# Patient Record
Sex: FEMALE | Race: WHITE | NOT HISPANIC OR LATINO | Employment: FULL TIME | URBAN - NONMETROPOLITAN AREA
[De-identification: names, ages, dates, MRNs, and addresses within clinical notes are randomized per-mention and may not be internally consistent; named-entity substitution may affect disease eponyms.]

---

## 2018-01-03 ENCOUNTER — HOSPITAL ENCOUNTER (EMERGENCY)
Facility: HOSPITAL | Age: 28
Discharge: HOME | End: 2018-01-03
Attending: STUDENT IN AN ORGANIZED HEALTH CARE EDUCATION/TRAINING PROGRAM | Admitting: STUDENT IN AN ORGANIZED HEALTH CARE EDUCATION/TRAINING PROGRAM

## 2018-01-03 VITALS
OXYGEN SATURATION: 99 % | HEART RATE: 77 BPM | WEIGHT: 205 LBS | TEMPERATURE: 97.6 F | HEIGHT: 63 IN | SYSTOLIC BLOOD PRESSURE: 137 MMHG | RESPIRATION RATE: 18 BRPM | DIASTOLIC BLOOD PRESSURE: 93 MMHG | BODY MASS INDEX: 36.32 KG/M2

## 2018-01-03 DIAGNOSIS — M54.50 ACUTE BILATERAL LOW BACK PAIN WITHOUT SCIATICA: Primary | ICD-10-CM

## 2018-01-03 PROCEDURE — 99283 EMERGENCY DEPT VISIT LOW MDM: CPT

## 2018-01-03 RX ORDER — IBUPROFEN 600 MG/1
600 TABLET ORAL EVERY 6 HOURS PRN
COMMUNITY
End: 2023-02-23

## 2018-01-03 RX ORDER — CYCLOBENZAPRINE HCL 10 MG
10 TABLET ORAL 3 TIMES DAILY PRN
Qty: 20 TABLET | Refills: 0 | Status: SHIPPED | OUTPATIENT
Start: 2018-01-03 | End: 2023-02-23

## 2018-01-03 RX ORDER — LABETALOL 100 MG/1
100 TABLET, FILM COATED ORAL 2 TIMES DAILY
COMMUNITY
End: 2023-02-23

## 2018-01-03 RX ORDER — RANITIDINE 150 MG/1
25 TABLET ORAL 2 TIMES DAILY
COMMUNITY
End: 2023-02-23

## 2018-01-03 RX ORDER — OXYCODONE AND ACETAMINOPHEN 10; 325 MG/1; MG/1
1 TABLET ORAL ONCE
Status: COMPLETED | OUTPATIENT
Start: 2018-01-03 | End: 2018-01-03

## 2018-01-03 RX ADMIN — OXYCODONE HYDROCHLORIDE AND ACETAMINOPHEN 1 TABLET: 10; 325 TABLET ORAL at 16:46

## 2018-01-03 NOTE — ED PROVIDER NOTES
Subjective   HPI Comments: 27-year-old female presents with acute lower back pain.     The patient had a epidural about 60 days ago and said that immediately thereafter she had some residual numbness to the right and left leg but the right leg resolved by the time she was out of the hospital.  She's had a little bit of numb sensation left leg but tells me today that that has gotten back to normal.  However, she's had her  do all the diaper changes and she's really been taking it easy around the house.  Today she had leaned over the bathtub and then also was changing some diapers etc. and developed the acute lower back pain.  She denies any saddle anesthesia, bowel-bladder incontinence or urinary retention.  There is some sharp pain into the left leg but not constantly and not below the knee.  She does have pain across her back.  No actual motor weakness.  She's been breast-feeding but says that she can certainly change to formula if needed.  She denies chronic back pain or any prior back surgery.  The pain does worsen with sitting and movement.  It eased by lying still.  No fever, chills, rash.      Review of Systems   All other systems reviewed and are negative.      History reviewed. No pertinent past medical history.    No Known Allergies    Past Surgical History:   Procedure Laterality Date   • CHOLECYSTECTOMY     • DENTAL PROCEDURE     • TONSILLECTOMY         History reviewed. No pertinent family history.    Social History     Social History   • Marital status:      Spouse name: N/A   • Number of children: N/A   • Years of education: N/A     Social History Main Topics   • Smoking status: Former Smoker     Quit date: 3/3/2017   • Smokeless tobacco: Never Used   • Alcohol use No   • Drug use: No   • Sexual activity: Not Asked     Other Topics Concern   • None     Social History Narrative   • None           Objective   Physical Exam   Constitutional: She is oriented to person, place, and time. She  "appears well-developed and well-nourished. No distress.   HENT:   Head: Normocephalic and atraumatic.   Eyes: EOM are normal. No scleral icterus.   Neck: Normal range of motion.   Cardiovascular: Normal rate, regular rhythm and normal heart sounds.    No murmur heard.  Pulmonary/Chest: Effort normal and breath sounds normal. No respiratory distress.   Abdominal: Bowel sounds are normal. She exhibits no distension.   Musculoskeletal: Normal range of motion. She exhibits no edema or deformity.   Limited range of motion with flexion and extension.  She has pain across the entire lower lumbar region with associated spasm.  Motor is intact and symmetrical upper and lower.  Sensation is likewise intact upper and lower.  Normal pedal pulses no peripheral edema.   Neurological: She is alert and oriented to person, place, and time. No cranial nerve deficit. She exhibits normal muscle tone. Coordination normal.   Skin: Skin is warm. No rash noted. She is not diaphoretic.   Psychiatric: She has a normal mood and affect. Her behavior is normal. Thought content normal.   Vitals reviewed.      Procedures         ED Course  ED Course   Comment By Time   I'm going to treat the patient for an acute lower back strain.  She has no neurologic red flags to indicate a neurosurgical emergency regarding her back.  I did explain that since she's been breast-feeding she's either going to have to switch to formula or \"pump and dump\" and she understands this.  Tells me her OB doctor has a lactation specialist and that she can call their office for guidance but for now she's going to switch to formula.  I recommended ibuprofen for the next several days and I'm going to try muscle relaxant as well.  I'm going to give her a Percocet by mouth here.  Given her symptoms I don't feel plain x-rays are going to be beneficial.  I explained thing she can do to help her back and also she needs to see her primary care provider in the next few days.  I " think physical therapy will help her more than anything and her PCP can refer her if they feel this would be beneficial.  The patient understands and is in agreement with my plan here today.  She understands that the ibuprofen, muscle relaxant and Percocet will all be in her breast milk if she doesn't pump and dump.  Said she will speak to the pharmacist as well about duration of time to wait if she does take these medicines and decide to breast feed Jerardo Srinivasan PA-C 01/03 1650                  [facility]    Final diagnoses:   Acute bilateral low back pain without sciatica            Jerardo Srinivasan PA-C  01/03/18 2253

## 2023-02-23 ENCOUNTER — OFFICE VISIT (OUTPATIENT)
Dept: FAMILY MEDICINE CLINIC | Facility: CLINIC | Age: 33
End: 2023-02-23
Payer: COMMERCIAL

## 2023-02-23 VITALS
BODY MASS INDEX: 45.36 KG/M2 | DIASTOLIC BLOOD PRESSURE: 80 MMHG | OXYGEN SATURATION: 97 % | HEART RATE: 108 BPM | SYSTOLIC BLOOD PRESSURE: 110 MMHG | WEIGHT: 256 LBS | HEIGHT: 63 IN | TEMPERATURE: 97.5 F

## 2023-02-23 DIAGNOSIS — E66.01 MORBID OBESITY WITH BMI OF 45.0-49.9, ADULT: ICD-10-CM

## 2023-02-23 DIAGNOSIS — F34.1 PERSISTENT DEPRESSIVE DISORDER: ICD-10-CM

## 2023-02-23 DIAGNOSIS — F41.9 ANXIETY: Primary | ICD-10-CM

## 2023-02-23 DIAGNOSIS — K21.9 GASTROESOPHAGEAL REFLUX DISEASE, UNSPECIFIED WHETHER ESOPHAGITIS PRESENT: ICD-10-CM

## 2023-02-23 PROCEDURE — 99203 OFFICE O/P NEW LOW 30 MIN: CPT | Performed by: PHYSICIAN ASSISTANT

## 2023-02-23 RX ORDER — OMEPRAZOLE 40 MG/1
40 CAPSULE, DELAYED RELEASE ORAL DAILY
COMMUNITY

## 2023-02-23 RX ORDER — FLUOXETINE HYDROCHLORIDE 20 MG/1
20 CAPSULE ORAL DAILY
COMMUNITY

## 2023-02-23 RX ORDER — CHOLECALCIFEROL (VITAMIN D3) 25 MCG
CAPSULE ORAL
COMMUNITY

## 2023-02-23 RX ORDER — CETIRIZINE HYDROCHLORIDE 10 MG/1
10 TABLET ORAL DAILY
COMMUNITY

## 2023-02-23 RX ORDER — ATENOLOL 25 MG/1
25 TABLET ORAL DAILY
COMMUNITY

## 2023-02-23 NOTE — PROGRESS NOTES
Subjective        Chief Complaint  Establish Care    Subjective      History of Present Illness  Rosa Thakkar is a 32 y.o. female who presents today to [facility] for Establish Care. Past medical history is significant for anxiety, GERD, depression, and obesity.     Anxiety:   Depression:   Currently managed on fluoxetine 20 mg daily.  Reports that she has been struggling more recently, but overall feels better since she has been on the fluoxetine for some time.  She is not interested in additional medication for now.  She has a family history of anxiety, depression, and suicide attempts in both her mother and sister.  She has also been on BuSpar in the past, however, feels that she did not give it appropriate time to help her symptoms.  She does have difficulty with sleeping currently.    GERD:  Managed on omeprazole 40 mg daily.  No previous history of endoscopies.  She was set up for an EGD in the past, but did not follow through.  Her symptoms have been well controlled on the omeprazole.    Chronic allergic rhinitis:   As long as she takes her cetirizine 10 mg daily, she does fairly well.  Denies any specific allergens.    Morbid obesity per BMI, 45.35:  Reports previous weight loss when she was placed on metformin for prediabetes a few years ago.  Her levels improved as did her weight, however, she came off of the medicine and she gained all of her weight back +.  She is not currently following any dietary guidelines or exercising regularly.      Current Outpatient Medications:   •  atenolol (TENORMIN) 25 MG tablet, Take 25 mg by mouth Daily., Disp: , Rfl:   •  cetirizine (zyrTEC) 10 MG tablet, Take 10 mg by mouth Daily., Disp: , Rfl:   •  Cholecalciferol (Vitamin D-3) 25 MCG (1000 UT) capsule, Take  by mouth., Disp: , Rfl:   •  FLUoxetine (PROzac) 20 MG capsule, Take 20 mg by mouth Daily., Disp: , Rfl:   •  omeprazole (priLOSEC) 40 MG capsule, Take 40 mg by mouth Daily.,  "Disp: , Rfl:       No Known Allergies    Objective     Objective   Vital Signs:  Blood Pressure 110/80   Pulse 108   Temperature 97.5 °F (36.4 °C) (Temporal)   Height 160 cm (63\")   Weight 116 kg (256 lb)   Oxygen Saturation 97%   Body Mass Index 45.35 kg/m²   Estimated body mass index is 45.35 kg/m² as calculated from the following:    Height as of this encounter: 160 cm (63\").    Weight as of this encounter: 116 kg (256 lb).        Past Medical History:   Diagnosis Date   • Anxiety    • Depression    • Fatigue    • GERD (gastroesophageal reflux disease)      Past Surgical History:   Procedure Laterality Date   • CHOLECYSTECTOMY     • DENTAL PROCEDURE     • OVARIAN CYST REMOVAL     • TONSILLECTOMY       Social History     Socioeconomic History   • Marital status:    Tobacco Use   • Smoking status: Former     Packs/day: 1.00     Years: 15.00     Pack years: 15.00     Types: Cigarettes     Quit date: 3/3/2017     Years since quittin.9   • Smokeless tobacco: Never   Vaping Use   • Vaping Use: Never used   Substance and Sexual Activity   • Alcohol use: No   • Drug use: No      Physical Exam  Vitals and nursing note reviewed.   Constitutional:       General: She is not in acute distress.     Appearance: She is well-developed. She is not diaphoretic.   HENT:      Head: Normocephalic and atraumatic.   Eyes:      General: No scleral icterus.        Right eye: No discharge.         Left eye: No discharge.      Conjunctiva/sclera: Conjunctivae normal.   Neck:      Vascular: No carotid bruit.   Cardiovascular:      Rate and Rhythm: Normal rate and regular rhythm.      Heart sounds: Normal heart sounds. No murmur heard.    No friction rub. No gallop.   Pulmonary:      Effort: Pulmonary effort is normal. No respiratory distress.      Breath sounds: Normal breath sounds. No wheezing or rales.   Chest:      Chest wall: No tenderness.   Musculoskeletal:         General: Normal range of motion.      Cervical back: " Normal range of motion and neck supple. No tenderness.   Lymphadenopathy:      Cervical: No cervical adenopathy.   Skin:     General: Skin is warm and dry.      Coloration: Skin is not pale.      Findings: No erythema or rash.   Neurological:      Mental Status: She is alert and oriented to person, place, and time.   Psychiatric:         Behavior: Behavior normal.        Result Review :    No visits with results within 3 Month(s) from this visit.   Latest known visit with results is:   No results found for any previous visit.               Assessment / Plan         Assessment   Diagnoses and all orders for this visit:    1. Anxiety (Primary)  2. Persistent depressive disorder  • Currently managed on fluoxetine 20 mg daily.  Continue.  She reports having refills at this time.  • Difficulty sleeping, could try a melatonin supplement.  Also discussed possibly trying a low-dose of hydroxyzine.  She plans to try melatonin for now.  • Return to clinic if no improvement noted or if symptoms are worsening.     3. Gastroesophageal reflux disease, unspecified whether esophagitis present  • Continue omeprazole.    4. Morbid obesity with BMI of 45.0-49.9, adult (HCC)  • Discussed diet, exercise, and cutting out sugary beverages.  • Also discussed possibility of referral to the weight management clinic.  • Also discussed possibility of initiating an injectable weight loss medication, such as Wegovy.  She is interested in this.  We will obtain her last set of labs from her prior PCP.  She denies any history of pancreatitis or family history of thyroid cancer.    Health Maintenance  • She reports having a Pap smear within the last year.  • With next set of labs, will add on one-time hepatitis C screening.    Follow Up   Return in about 3 months (around 5/23/2023).    Patient was given instructions and counseling regarding her condition or for health maintenance advice. Please see specific information pulled into the AVS if  appropriate.       This document has been electronically signed by MAITE Stanford   February 23, 2023 09:08 EST    Dictated Utilizing Dragon Dictation: Part of this note may be an electronic transcription/translation of spoken language to printed text using the Dragon Dictation System.

## 2023-03-21 ENCOUNTER — TELEPHONE (OUTPATIENT)
Dept: FAMILY MEDICINE CLINIC | Facility: CLINIC | Age: 33
End: 2023-03-21

## 2023-03-21 NOTE — TELEPHONE ENCOUNTER
Caller: Rosa Thakkar    Relationship: Self    Best call back number: [phone] TO LEAVE DETAILED MESSAGE IF NO ANSWER    What is the best time to reach you: ANY    Who are you requesting to speak with (clinical staff, provider,  specific staff member): CLINICAL    What was the call regarding: CHECKING STATUS IF OUTSIDE LAB RESULTS HAVE ARRIVED. LAB RESULTS FROM [facility] IN [place].     Do you require a callback: PLEASE CALL

## 2023-03-23 NOTE — TELEPHONE ENCOUNTER
Pt was concerned about her A1C. I didn't see that they completed a a1c on her last set of labs. Is it okay if she comes in for just a a1c?

## 2023-05-08 ENCOUNTER — OFFICE VISIT (OUTPATIENT)
Dept: FAMILY MEDICINE CLINIC | Facility: CLINIC | Age: 33
End: 2023-05-08
Payer: COMMERCIAL

## 2023-05-08 ENCOUNTER — TELEPHONE (OUTPATIENT)
Dept: FAMILY MEDICINE CLINIC | Facility: CLINIC | Age: 33
End: 2023-05-08
Payer: COMMERCIAL

## 2023-05-08 VITALS
OXYGEN SATURATION: 98 % | SYSTOLIC BLOOD PRESSURE: 110 MMHG | DIASTOLIC BLOOD PRESSURE: 80 MMHG | HEIGHT: 63 IN | WEIGHT: 259.4 LBS | HEART RATE: 77 BPM | BODY MASS INDEX: 45.96 KG/M2 | TEMPERATURE: 97.7 F

## 2023-05-08 DIAGNOSIS — F34.1 PERSISTENT DEPRESSIVE DISORDER: ICD-10-CM

## 2023-05-08 DIAGNOSIS — F41.9 ANXIETY: Primary | ICD-10-CM

## 2023-05-08 DIAGNOSIS — E55.9 VITAMIN D DEFICIENCY: ICD-10-CM

## 2023-05-08 DIAGNOSIS — J30.2 SEASONAL ALLERGIC RHINITIS, UNSPECIFIED TRIGGER: ICD-10-CM

## 2023-05-08 DIAGNOSIS — E66.01 MORBID OBESITY WITH BMI OF 45.0-49.9, ADULT: ICD-10-CM

## 2023-05-08 DIAGNOSIS — Z00.00 HEALTH MAINTENANCE EXAMINATION: ICD-10-CM

## 2023-05-08 DIAGNOSIS — K21.9 GASTROESOPHAGEAL REFLUX DISEASE, UNSPECIFIED WHETHER ESOPHAGITIS PRESENT: ICD-10-CM

## 2023-05-08 LAB
25(OH)D3 SERPL-MCNC: 27.9 NG/ML (ref 30–100)
ALBUMIN SERPL-MCNC: 4.3 G/DL (ref 3.5–5.2)
ALBUMIN/GLOB SERPL: 1.7 G/DL
ALP SERPL-CCNC: 62 U/L (ref 39–117)
ALT SERPL W P-5'-P-CCNC: 54 U/L (ref 1–33)
ANION GAP SERPL CALCULATED.3IONS-SCNC: 13.3 MMOL/L (ref 5–15)
AST SERPL-CCNC: 31 U/L (ref 1–32)
BASOPHILS # BLD AUTO: 0.06 10*3/MM3 (ref 0–0.2)
BASOPHILS NFR BLD AUTO: 0.8 % (ref 0–1.5)
BILIRUB SERPL-MCNC: 0.7 MG/DL (ref 0–1.2)
BUN SERPL-MCNC: 11 MG/DL (ref 6–20)
BUN/CREAT SERPL: 15.1 (ref 7–25)
CALCIUM SPEC-SCNC: 9.5 MG/DL (ref 8.6–10.5)
CHLORIDE SERPL-SCNC: 101 MMOL/L (ref 98–107)
CHOLEST SERPL-MCNC: 158 MG/DL (ref 0–200)
CO2 SERPL-SCNC: 21.7 MMOL/L (ref 22–29)
CREAT SERPL-MCNC: 0.73 MG/DL (ref 0.57–1)
DEPRECATED RDW RBC AUTO: 41.9 FL (ref 37–54)
EGFRCR SERPLBLD CKD-EPI 2021: 110.1 ML/MIN/1.73
EOSINOPHIL # BLD AUTO: 0.39 10*3/MM3 (ref 0–0.4)
EOSINOPHIL NFR BLD AUTO: 5.3 % (ref 0.3–6.2)
ERYTHROCYTE [DISTWIDTH] IN BLOOD BY AUTOMATED COUNT: 13.8 % (ref 12.3–15.4)
GLOBULIN UR ELPH-MCNC: 2.6 GM/DL
GLUCOSE SERPL-MCNC: 95 MG/DL (ref 65–99)
HBA1C MFR BLD: 5.7 % (ref 4.8–5.6)
HCT VFR BLD AUTO: 44.1 % (ref 34–46.6)
HDLC SERPL-MCNC: 47 MG/DL (ref 40–60)
HGB BLD-MCNC: 14.4 G/DL (ref 12–15.9)
IMM GRANULOCYTES # BLD AUTO: 0.02 10*3/MM3 (ref 0–0.05)
IMM GRANULOCYTES NFR BLD AUTO: 0.3 % (ref 0–0.5)
LDLC SERPL CALC-MCNC: 91 MG/DL (ref 0–100)
LDLC/HDLC SERPL: 1.91 {RATIO}
LYMPHOCYTES # BLD AUTO: 1.73 10*3/MM3 (ref 0.7–3.1)
LYMPHOCYTES NFR BLD AUTO: 23.5 % (ref 19.6–45.3)
MCH RBC QN AUTO: 27.1 PG (ref 26.6–33)
MCHC RBC AUTO-ENTMCNC: 32.7 G/DL (ref 31.5–35.7)
MCV RBC AUTO: 83.1 FL (ref 79–97)
MONOCYTES # BLD AUTO: 0.52 10*3/MM3 (ref 0.1–0.9)
MONOCYTES NFR BLD AUTO: 7.1 % (ref 5–12)
NEUTROPHILS NFR BLD AUTO: 4.63 10*3/MM3 (ref 1.7–7)
NEUTROPHILS NFR BLD AUTO: 63 % (ref 42.7–76)
NRBC BLD AUTO-RTO: 0 /100 WBC (ref 0–0.2)
PLATELET # BLD AUTO: 297 10*3/MM3 (ref 140–450)
PMV BLD AUTO: 10 FL (ref 6–12)
POTASSIUM SERPL-SCNC: 4.5 MMOL/L (ref 3.5–5.2)
PROT SERPL-MCNC: 6.9 G/DL (ref 6–8.5)
RBC # BLD AUTO: 5.31 10*6/MM3 (ref 3.77–5.28)
SODIUM SERPL-SCNC: 136 MMOL/L (ref 136–145)
TRIGL SERPL-MCNC: 107 MG/DL (ref 0–150)
TSH SERPL DL<=0.05 MIU/L-ACNC: 2.37 UIU/ML (ref 0.27–4.2)
VLDLC SERPL-MCNC: 20 MG/DL (ref 5–40)
WBC NRBC COR # BLD: 7.35 10*3/MM3 (ref 3.4–10.8)

## 2023-05-08 PROCEDURE — 80061 LIPID PANEL: CPT | Performed by: PHYSICIAN ASSISTANT

## 2023-05-08 PROCEDURE — 84443 ASSAY THYROID STIM HORMONE: CPT | Performed by: PHYSICIAN ASSISTANT

## 2023-05-08 PROCEDURE — 83036 HEMOGLOBIN GLYCOSYLATED A1C: CPT | Performed by: PHYSICIAN ASSISTANT

## 2023-05-08 PROCEDURE — 82306 VITAMIN D 25 HYDROXY: CPT | Performed by: PHYSICIAN ASSISTANT

## 2023-05-08 PROCEDURE — 85025 COMPLETE CBC W/AUTO DIFF WBC: CPT | Performed by: PHYSICIAN ASSISTANT

## 2023-05-08 PROCEDURE — 80053 COMPREHEN METABOLIC PANEL: CPT | Performed by: PHYSICIAN ASSISTANT

## 2023-05-08 RX ORDER — FAMOTIDINE 40 MG/1
TABLET, FILM COATED ORAL
COMMUNITY
Start: 2023-05-07 | End: 2023-05-08 | Stop reason: SDUPTHER

## 2023-05-08 RX ORDER — ATENOLOL 25 MG/1
25 TABLET ORAL DAILY
Qty: 30 TABLET | Refills: 5 | Status: SHIPPED | OUTPATIENT
Start: 2023-05-08

## 2023-05-08 RX ORDER — FLUOXETINE HYDROCHLORIDE 20 MG/1
20 CAPSULE ORAL DAILY
Qty: 30 CAPSULE | Refills: 5 | Status: SHIPPED | OUTPATIENT
Start: 2023-05-08

## 2023-05-08 RX ORDER — FAMOTIDINE 40 MG/1
40 TABLET, FILM COATED ORAL DAILY
Qty: 30 TABLET | Refills: 5 | Status: SHIPPED | OUTPATIENT
Start: 2023-05-08

## 2023-05-08 RX ORDER — AMOXICILLIN 875 MG/1
1 TABLET, COATED ORAL EVERY 12 HOURS SCHEDULED
COMMUNITY
Start: 2023-02-23 | End: 2023-05-08

## 2023-05-08 RX ORDER — CETIRIZINE HYDROCHLORIDE 10 MG/1
10 TABLET ORAL DAILY
Qty: 30 TABLET | Refills: 5 | Status: SHIPPED | OUTPATIENT
Start: 2023-05-08

## 2023-05-08 NOTE — PATIENT INSTRUCTIONS
BMI for Adults  Body mass index (BMI) is a number that is calculated from a person's weight and height. In most adults, the number is used to find how much of an adult's weight is made up of fat. BMI is not as accurate as a direct measure of body fat.  HOW IS BMI CALCULATED?  BMI is calculated by dividing weight in kilograms by height in meters squared. It can also be calculated by dividing weight in pounds by height in inches squared, then multiplying the resulting number by 703. Charts are available to help you find your BMI quickly and easily without doing this calculation.   HOW IS BMI INTERPRETED?  Health care professionals use BMI charts to identify whether an adult is underweight, at a normal weight, or overweight based on the following guidelines:  Underweight: BMI less than 18.5.  Normal weight: BMI between 18.5 and 24.9.  Overweight: BMI between 25 and 29.9.  Obese: BMI of 30 and above.  BMI is usually interpreted the same for males and females.  Weight includes both fat and muscle, so someone with a muscular build, such as an athlete, may have a BMI that is higher than 24.9. In cases like these, BMI may not accurately depict body fat. To determine if excess body fat is the cause of a BMI of 25 or higher, further assessments may need to be done by a health care provider.  WHY IS BMI A USEFUL TOOL?  BMI is used to identify a possible weight problem that may be related to a medical problem or may increase the risk for medical problems. BMI can also be used to promote changes to reach a healthy weight.     This information is not intended to replace advice given to you by your health care provider. Make sure you discuss any questions you have with your health care provider.     Document Released: 08/29/2005 Document Revised: 01/08/2016 Document Reviewed: 05/15/2015  [employer] Interactive Patient Education ©2017 [employer]       Calorie Counting for Weight Loss  Calories are energy you get from the things  you eat and drink. Your body uses this energy to keep you going throughout the day. The number of calories you eat affects your weight. When you eat more calories than your body needs, your body stores the extra calories as fat. When you eat fewer calories than your body needs, your body burns fat to get the energy it needs.  Calorie counting means keeping track of how many calories you eat and drink each day. If you make sure to eat fewer calories than your body needs, you should lose weight. In order for calorie counting to work, you will need to eat the number of calories that are right for you in a day to lose a healthy amount of weight per week. A healthy amount of weight to lose per week is usually 1-2 lb (0.5-0.9 kg). A dietitian can determine how many calories you need in a day and give you suggestions on how to reach your calorie goal.   WHAT IS MY MY PLAN?  My goal is to have __________ calories per day.   If I have this many calories per day, I should lose around __________ pounds per week.  WHAT DO I NEED TO KNOW ABOUT CALORIE COUNTING?  In order to meet your daily calorie goal, you will need to:  Find out how many calories are in each food you would like to eat. Try to do this before you eat.  Decide how much of the food you can eat.  Write down what you ate and how many calories it had. Doing this is called keeping a food log.  WHERE DO I FIND CALORIE INFORMATION?  The number of calories in a food can be found on a Nutrition Facts label. Note that all the information on a label is based on a specific serving of the food. If a food does not have a Nutrition Facts label, try to look up the calories online or ask your dietitian for help.  HOW DO I DECIDE HOW MUCH TO EAT?  To decide how much of the food you can eat, you will need to consider both the number of calories in one serving and the size of one serving. This information can be found on the Nutrition Facts label. If a food does not have a Nutrition  Facts label, look up the information online or ask your dietitian for help.  Remember that calories are listed per serving. If you choose to have more than one serving of a food, you will have to multiply the calories per serving by the amount of servings you plan to eat. For example, the label on a package of bread might say that a serving size is 1 slice and that there are 90 calories in a serving. If you eat 1 slice, you will have eaten 90 calories. If you eat 2 slices, you will have eaten 180 calories.  HOW DO I KEEP A FOOD LOG?  After each meal, record the following information in your food log:  What you ate.  How much of it you ate.  How many calories it had.  Then, add up your calories.  Keep your food log near you, such as in a small notebook in your pocket. Another option is to use a mobile lorie or website. Some programs will calculate calories for you and show you how many calories you have left each time you add an item to the log.  WHAT ARE SOME CALORIE COUNTING TIPS?  Use your calories on foods and drinks that will fill you up and not leave you hungry. Some examples of this include foods like nuts and nut butters, vegetables, lean proteins, and high-fiber foods (more than 5 g fiber per serving).  Eat nutritious foods and avoid empty calories. Empty calories are calories you get from foods or beverages that do not have many nutrients, such as candy and soda. It is better to have a nutritious high-calorie food (such as an avocado) than a food with few nutrients (such as a bag of chips).  Know how many calories are in the foods you eat most often. This way, you do not have to look up how many calories they have each time you eat them.  Look out for foods that may seem like low-calorie foods but are really high-calorie foods, such as baked goods, soda, and fat-free candy.  Pay attention to calories in drinks. Drinks such as sodas, specialty coffee drinks, alcohol, and juices have a lot of calories yet do  not fill you up. Choose low-calorie drinks like water and diet drinks.  Focus your calorie counting efforts on higher calorie items. Logging the calories in a garden salad that contains only vegetables is less important than calculating the calories in a milk shake.  Find a way of tracking calories that works for you. Get creative. Most people who are successful find ways to keep track of how much they eat in a day, even if they do not count every calorie.  WHAT ARE SOME PORTION CONTROL TIPS?  Know how many calories are in a serving. This will help you know how many servings of a certain food you can have.  Use a measuring cup to measure serving sizes. This is helpful when you start out. With time, you will be able to estimate serving sizes for some foods.  Take some time to put servings of different foods on your favorite plates, bowls, and cups so you know what a serving looks like.  Try not to eat straight from a bag or box. Doing this can lead to overeating. Put the amount you would like to eat in a cup or on a plate to make sure you are eating the right portion.  Use smaller plates, glasses, and bowls to prevent overeating. This is a quick and easy way to practice portion control. If your plate is smaller, less food can fit on it.  Try not to multitask while eating, such as watching TV or using your computer. If it is time to eat, sit down at a table and enjoy your food. Doing this will help you to start recognizing when you are full. It will also make you more aware of what and how much you are eating.  HOW CAN I CALORIE COUNT WHEN EATING OUT?  Ask for smaller portion sizes or child-sized portions.  Consider sharing an entree and sides instead of getting your own entree.  If you get your own entree, eat only half. Ask for a box at the beginning of your meal and put the rest of your entree in it so you are not tempted to eat it.  Look for the calories on the menu. If calories are listed, choose the lower  "calorie options.  Choose dishes that include vegetables, fruits, whole grains, low-fat dairy products, and lean protein. Focusing on smart food choices from each of the 5 food groups can help you stay on track at restaurants.  Choose items that are boiled, broiled, grilled, or steamed.  Choose water, milk, unsweetened iced tea, or other drinks without added sugars. If you want an alcoholic beverage, choose a lower calorie option. For example, a regular desire can have up to 700 calories and a glass of wine has around 150.  Stay away from items that are buttered, battered, fried, or served with cream sauce. Items labeled \"crispy\" are usually fried, unless stated otherwise.  Ask for dressings, sauces, and syrups on the side. These are usually very high in calories, so do not eat much of them.  Watch out for salads. Many people think salads are a healthy option, but this is often not the case. Many salads come with crocker, fried chicken, lots of cheese, fried chips, and dressing. All of these items have a lot of calories. If you want a salad, choose a garden salad and ask for grilled meats or steak. Ask for the dressing on the side, or ask for olive oil and vinegar or lemon to use as dressing.  Estimate how many servings of a food you are given. For example, a serving of cooked rice is ½ cup or about the size of half a tennis ball or one cupcake wrapper. Knowing serving sizes will help you be aware of how much food you are eating at restaurants. The list below tells you how big or small some common portion sizes are based on everyday objects.  1 oz--4 stacked dice.  3 oz--1 deck of cards.  1 tsp--1 dice.  1 Tbsp--½ a Ping-Pong ball.  2 Tbsp--1 Ping-Pong ball.  ½ cup--1 tennis ball or 1 cupcake wrapper.  1 cup--1 baseball.     This information is not intended to replace advice given to you by your health care provider. Make sure you discuss any questions you have with your health care provider.     Document Released: " 12/18/2006 Document Revised: 01/08/2016 Document Reviewed: 10/23/2014  [employer] Interactive Patient Education ©2017 [employer]

## 2023-05-08 NOTE — PROGRESS NOTES
Subjective      Chief Complaint  Follow-up multiple medical conditions    Subjective      History of Present Illness  Rosa Thakkar is a 33 y.o. female who presents today to [facility] for follow up multiple medical conditions.  Past medical history is significant for anxiety, GERD, depression, and obesity.     Anxiety:   Depression:   Currently managed on fluoxetine 20 mg daily and reports stability.     She has a family history of anxiety, depression, and suicide attempts in both her mother and sister.  She has also been on BuSpar in the past, however, feels that she did not give it appropriate time to help her symptoms.    GERD:  Managed on pepcid 40 mg daily.  No previous history of endoscopies.  She was set up for an EGD in the past, but did not follow through. She reports she received a different generic of her pepcid recently and this has made her acid reflux symptoms worse.     Chronic allergic rhinitis:   As long as she takes her cetirizine 10 mg daily, she does fairly well.  Denies any specific allergens.    Palpitations:   She reports a past history of palpitations for which she chronically takes atenolol.  Atenolol was also used to help control her anxiety.  She reports stability on atenolol 25 mg daily.    Morbid obesity per BMI, 45.35:  Reports previous weight loss when she was placed on metformin for prediabetes a few years ago.  Her levels improved as did her weight, however, she came off of the medicine and she gained all of her weight back +.       Current Outpatient Medications:   •  atenolol (TENORMIN) 25 MG tablet, Take 1 tablet by mouth Daily., Disp: 30 tablet, Rfl: 5  •  cetirizine (zyrTEC) 10 MG tablet, Take 1 tablet by mouth Daily., Disp: 30 tablet, Rfl: 5  •  Cholecalciferol (Vitamin D-3) 25 MCG (1000 UT) capsule, Take  by mouth., Disp: , Rfl:   •  famotidine (PEPCID) 40 MG tablet, Take 1 tablet by mouth Daily., Disp: 30 tablet, Rfl: 5  •  FLUoxetine  "(PROzac) 20 MG capsule, Take 1 capsule by mouth Daily., Disp: 30 capsule, Rfl: 5  •  Semaglutide-Weight Management 0.25 MG/0.5ML solution auto-injector, Inject 0.25 mg under the skin into the appropriate area as directed 1 (One) Time Per Week., Disp: 2 mL, Rfl: 0      No Known Allergies    Objective     Objective   Vital Signs:  Blood Pressure 110/80   Pulse 77   Temperature 97.7 °F (36.5 °C) (Temporal)   Height 160 cm (62.99\")   Weight 118 kg (259 lb 6.4 oz)   Oxygen Saturation 98%   Body Mass Index 45.96 kg/m²   Estimated body mass index is 45.96 kg/m² as calculated from the following:    Height as of this encounter: 160 cm (62.99\").    Weight as of this encounter: 118 kg (259 lb 6.4 oz).    Past Medical History:   Diagnosis Date   • Anxiety    • Depression    • Fatigue    • GERD (gastroesophageal reflux disease)      Past Surgical History:   Procedure Laterality Date   • CHOLECYSTECTOMY     • DENTAL PROCEDURE     • OVARIAN CYST REMOVAL     • TONSILLECTOMY       Social History     Socioeconomic History   • Marital status:    Tobacco Use   • Smoking status: Former     Packs/day: 1.00     Years: 15.00     Pack years: 15.00     Types: Cigarettes     Quit date: 3/3/2017     Years since quittin.1   • Smokeless tobacco: Never   Vaping Use   • Vaping Use: Never used   Substance and Sexual Activity   • Alcohol use: No   • Drug use: No      Physical Exam  Vitals and nursing note reviewed.   Constitutional:       General: She is not in acute distress.     Appearance: She is well-developed. She is not diaphoretic.   HENT:      Head: Normocephalic and atraumatic.   Eyes:      General: No scleral icterus.        Right eye: No discharge.         Left eye: No discharge.      Conjunctiva/sclera: Conjunctivae normal.   Neck:      Vascular: No carotid bruit.   Cardiovascular:      Rate and Rhythm: Normal rate and regular rhythm.      Heart sounds: Normal heart sounds. No murmur heard.    No friction rub. No gallop. "   Pulmonary:      Effort: Pulmonary effort is normal. No respiratory distress.      Breath sounds: Normal breath sounds. No wheezing or rales.   Chest:      Chest wall: No tenderness.   Musculoskeletal:         General: Normal range of motion.      Cervical back: Normal range of motion and neck supple. No tenderness.   Lymphadenopathy:      Cervical: No cervical adenopathy.   Skin:     General: Skin is warm and dry.      Coloration: Skin is not pale.      Findings: No erythema or rash.   Neurological:      Mental Status: She is alert and oriented to person, place, and time.   Psychiatric:         Behavior: Behavior normal.        Result Review :    No visits with results within 3 Month(s) from this visit.   Latest known visit with results is:   No results found for any previous visit.          Assessment / Plan         Assessment   Diagnoses and all orders for this visit:    1. Anxiety (Primary)  2. Persistent depressive disorder  • Currently managed on fluoxetine 20 mg daily.  Continue. Refills sent.    3. Gastroesophageal reflux disease, unspecified whether esophagitis present  • Continue pepcid. Message sent to pharmacy to try to stick with a specific generic that she feels she does better with. I have asked her to discuss this with there pharmacy as well.     4. Morbid obesity with BMI of 45.0-49.9, adult (HCC)  • Discussed diet, exercise, and cutting out sugary beverages.  • Also discussed possibility of initiating an injectable weight loss medication, such as Wegovy.  She is interested in this and RX submitted for PA. She denies any history of pancreatitis or family history of medullary thyroid cancer or other endocrine cancers.     Health Maintenance  • She reports having a Pap smear within the last year.  • With next set of labs, will add on one-time hepatitis C screening.    Follow Up   Return in about 1 month (around 6/8/2023).    Patient was given instructions and counseling regarding her condition or for  health maintenance advice. Please see specific information pulled into the AVS if appropriate.       This document has been electronically signed by MAITE Stanford   May 8, 2023 09:26 EDT    Dictated Utilizing Dragon Dictation: Part of this note may be an electronic transcription/translation of spoken language to printed text using the Dragon Dictation System.

## 2023-06-12 ENCOUNTER — OFFICE VISIT (OUTPATIENT)
Dept: FAMILY MEDICINE CLINIC | Facility: CLINIC | Age: 33
End: 2023-06-12
Payer: COMMERCIAL

## 2023-06-12 VITALS
DIASTOLIC BLOOD PRESSURE: 80 MMHG | SYSTOLIC BLOOD PRESSURE: 115 MMHG | BODY MASS INDEX: 46 KG/M2 | TEMPERATURE: 97.6 F | WEIGHT: 259.6 LBS | OXYGEN SATURATION: 98 % | HEART RATE: 85 BPM | HEIGHT: 63 IN

## 2023-06-12 DIAGNOSIS — F41.9 ANXIETY: Primary | ICD-10-CM

## 2023-06-12 DIAGNOSIS — E66.01 MORBID OBESITY WITH BMI OF 45.0-49.9, ADULT: ICD-10-CM

## 2023-06-12 DIAGNOSIS — K21.9 GASTROESOPHAGEAL REFLUX DISEASE, UNSPECIFIED WHETHER ESOPHAGITIS PRESENT: ICD-10-CM

## 2023-06-12 DIAGNOSIS — E55.9 VITAMIN D DEFICIENCY: ICD-10-CM

## 2023-06-12 DIAGNOSIS — F34.1 PERSISTENT DEPRESSIVE DISORDER: ICD-10-CM

## 2023-06-12 NOTE — PROGRESS NOTES
Subjective      Chief Complaint  Follow-up multiple medical conditions    Subjective      History of Present Illness  Rosa Thakkar is a 33 y.o. female who presents today to [facility] for follow up multiple medical conditions. Past medical history is significant for anxiety, GERD, depression, and obesity.     Anxiety:   Depression:   Currently managed on fluoxetine 20 mg daily and reports overall stability.  She has had some slight increase in anxiety and depression with concerns over her weight.    She has a family history of anxiety, depression, and suicide attempts in both her mother and sister.  She has also been on BuSpar in the past, however, feels that she did not give it appropriate time to help her symptoms.     GERD:  Managed on pepcid 40 mg daily.  No previous history of endoscopies.  She was set up for an EGD in the past, but did not follow through.    Chronic allergic rhinitis:   As long as she takes her cetirizine 10 mg daily, she does fairly well.  Denies any specific allergens.    Palpitations:   She reports a past history of palpitations for which she chronically takes atenolol.  Atenolol was also used to help control her anxiety.  She reports stability on atenolol 25 mg daily.    Morbid obesity per BMI, 46.00:  Reports previous weight loss when she was placed on metformin for prediabetes a few years ago.  Her levels improved as did her weight, however, she came off of the medicine and she gained all of her weight back +.  We did recently check on the cost of GLP-1 agonist, however, even with insurance this was going to be $1300 a month.  Busy schedules with work and her children make weight loss difficult.    Vitamin D deficiency:  Recently noted to have persistent deficiency despite maintenance dose of 1000 units vitamin D daily.  This was increased to 2000 units daily.  She reports she does feel better on the higher dose.      Current Outpatient Medications:  "    atenolol (TENORMIN) 25 MG tablet, Take 1 tablet by mouth Daily., Disp: 30 tablet, Rfl: 5    cetirizine (zyrTEC) 10 MG tablet, Take 1 tablet by mouth Daily., Disp: 30 tablet, Rfl: 5    Cholecalciferol (Vitamin D-3) 25 MCG (1000 UT) capsule, Take 2,000 Units by mouth., Disp: , Rfl:     famotidine (PEPCID) 40 MG tablet, Take 1 tablet by mouth Daily., Disp: 30 tablet, Rfl: 5    FLUoxetine (PROzac) 20 MG capsule, Take 1 capsule by mouth Daily., Disp: 30 capsule, Rfl: 5    metFORMIN (GLUCOPHAGE) 500 MG tablet, Start with 500mg once daily x1 week, then increase to twice daily with meals., Disp: 60 tablet, Rfl: 3      No Known Allergies    Objective     Objective   Vital Signs:  Blood Pressure 115/80   Pulse 85   Temperature 97.6 °F (36.4 °C) (Temporal)   Height 160 cm (62.99\")   Weight 118 kg (259 lb 9.6 oz)   Oxygen Saturation 98%   Body Mass Index 46.00 kg/m²   Estimated body mass index is 46 kg/m² as calculated from the following:    Height as of this encounter: 160 cm (62.99\").    Weight as of this encounter: 118 kg (259 lb 9.6 oz).    Class 3 Severe Obesity (BMI >=40). Obesity-related health conditions include the following: GERD. Obesity is unchanged. BMI is is above average; BMI management plan is completed. We discussed portion control and increasing exercise.      Past Medical History:   Diagnosis Date    Anxiety     Depression     Fatigue     GERD (gastroesophageal reflux disease)      Past Surgical History:   Procedure Laterality Date    CHOLECYSTECTOMY      DENTAL PROCEDURE      OVARIAN CYST REMOVAL      TONSILLECTOMY       Social History     Socioeconomic History    Marital status:    Tobacco Use    Smoking status: Former     Packs/day: 1.00     Years: 15.00     Pack years: 15.00     Types: Cigarettes     Quit date: 3/3/2017     Years since quittin.2    Smokeless tobacco: Never   Vaping Use    Vaping Use: Never used   Substance and Sexual Activity    Alcohol use: No    Drug use: No    "   Physical Exam  Vitals and nursing note reviewed.   Constitutional:       General: She is not in acute distress.     Appearance: She is well-developed. She is not diaphoretic.   HENT:      Head: Normocephalic and atraumatic.   Eyes:      General: No scleral icterus.        Right eye: No discharge.         Left eye: No discharge.      Conjunctiva/sclera: Conjunctivae normal.   Neck:      Vascular: No carotid bruit.   Cardiovascular:      Rate and Rhythm: Normal rate and regular rhythm.      Heart sounds: Normal heart sounds. No murmur heard.    No friction rub. No gallop.   Pulmonary:      Effort: Pulmonary effort is normal. No respiratory distress.      Breath sounds: Normal breath sounds. No wheezing or rales.   Chest:      Chest wall: No tenderness.   Musculoskeletal:         General: Normal range of motion.      Cervical back: Normal range of motion and neck supple. No tenderness.   Lymphadenopathy:      Cervical: No cervical adenopathy.   Skin:     General: Skin is warm and dry.      Coloration: Skin is not pale.      Findings: No erythema or rash.   Neurological:      Mental Status: She is alert and oriented to person, place, and time.   Psychiatric:         Behavior: Behavior normal.      Result Review :    Office Visit on 05/08/2023   Component Date Value Ref Range Status    Glucose 05/08/2023 95  65 - 99 mg/dL Final    BUN 05/08/2023 11  6 - 20 mg/dL Final    Creatinine 05/08/2023 0.73  0.57 - 1.00 mg/dL Final    Sodium 05/08/2023 136  136 - 145 mmol/L Final    Potassium 05/08/2023 4.5  3.5 - 5.2 mmol/L Final    Chloride 05/08/2023 101  98 - 107 mmol/L Final    CO2 05/08/2023 21.7 (L)  22.0 - 29.0 mmol/L Final    Calcium 05/08/2023 9.5  8.6 - 10.5 mg/dL Final    Total Protein 05/08/2023 6.9  6.0 - 8.5 g/dL Final    Albumin 05/08/2023 4.3  3.5 - 5.2 g/dL Final    ALT (SGPT) 05/08/2023 54 (H)  1 - 33 U/L Final    AST (SGOT) 05/08/2023 31  1 - 32 U/L Final    Alkaline Phosphatase 05/08/2023 62  39 - 117 U/L  Final    Total Bilirubin 05/08/2023 0.7  0.0 - 1.2 mg/dL Final    Globulin 05/08/2023 2.6  gm/dL Final    A/G Ratio 05/08/2023 1.7  g/dL Final    BUN/Creatinine Ratio 05/08/2023 15.1  7.0 - 25.0 Final    Anion Gap 05/08/2023 13.3  5.0 - 15.0 mmol/L Final    eGFR 05/08/2023 110.1  >60.0 mL/min/1.73 Final    Total Cholesterol 05/08/2023 158  0 - 200 mg/dL Final    Triglycerides 05/08/2023 107  0 - 150 mg/dL Final    HDL Cholesterol 05/08/2023 47  40 - 60 mg/dL Final    LDL Cholesterol  05/08/2023 91  0 - 100 mg/dL Final    VLDL Cholesterol 05/08/2023 20  5 - 40 mg/dL Final    LDL/HDL Ratio 05/08/2023 1.91   Final    TSH 05/08/2023 2.370  0.270 - 4.200 uIU/mL Final    Hemoglobin A1C 05/08/2023 5.70 (H)  4.80 - 5.60 % Final    25 Hydroxy, Vitamin D 05/08/2023 27.9 (L)  30.0 - 100.0 ng/ml Final    WBC 05/08/2023 7.35  3.40 - 10.80 10*3/mm3 Final    RBC 05/08/2023 5.31 (H)  3.77 - 5.28 10*6/mm3 Final    Hemoglobin 05/08/2023 14.4  12.0 - 15.9 g/dL Final    Hematocrit 05/08/2023 44.1  34.0 - 46.6 % Final    MCV 05/08/2023 83.1  79.0 - 97.0 fL Final    MCH 05/08/2023 27.1  26.6 - 33.0 pg Final    MCHC 05/08/2023 32.7  31.5 - 35.7 g/dL Final    RDW 05/08/2023 13.8  12.3 - 15.4 % Final    RDW-SD 05/08/2023 41.9  37.0 - 54.0 fl Final    MPV 05/08/2023 10.0  6.0 - 12.0 fL Final    Platelets 05/08/2023 297  140 - 450 10*3/mm3 Final    Neutrophil % 05/08/2023 63.0  42.7 - 76.0 % Final    Lymphocyte % 05/08/2023 23.5  19.6 - 45.3 % Final    Monocyte % 05/08/2023 7.1  5.0 - 12.0 % Final    Eosinophil % 05/08/2023 5.3  0.3 - 6.2 % Final    Basophil % 05/08/2023 0.8  0.0 - 1.5 % Final    Immature Grans % 05/08/2023 0.3  0.0 - 0.5 % Final    Neutrophils, Absolute 05/08/2023 4.63  1.70 - 7.00 10*3/mm3 Final    Lymphocytes, Absolute 05/08/2023 1.73  0.70 - 3.10 10*3/mm3 Final    Monocytes, Absolute 05/08/2023 0.52  0.10 - 0.90 10*3/mm3 Final    Eosinophils, Absolute 05/08/2023 0.39  0.00 - 0.40 10*3/mm3 Final    Basophils, Absolute  05/08/2023 0.06  0.00 - 0.20 10*3/mm3 Final    Immature Grans, Absolute 05/08/2023 0.02  0.00 - 0.05 10*3/mm3 Final    nRBC 05/08/2023 0.0  0.0 - 0.2 /100 WBC Final          Assessment / Plan         Assessment   Diagnoses and all orders for this visit:    1. Anxiety (Primary)  2. Persistent depressive disorder  Currently managed on fluoxetine 20 mg daily, continue.  We did discuss the option of possibly adding or switching to Wellbutrin should her depression persist given it also does have some weight loss as a potential side effect.  She wishes to continue on her fluoxetine for now.    3. Gastroesophageal reflux disease, unspecified whether esophagitis present  Continue pepcid.     4. Morbid obesity with BMI of 45.0-49.9, adult (HCC)  5. Prediabetes  Discussed diet, exercise, and cutting out sugary beverages.  She is interested in getting started back on metformin as it did help her to control her glucose and lose some weight in the past.  We will start this off at 500 mg daily x1 week, then increase to 500 mg twice daily.    5. Vitamin D Deficiency:  Continue supplementation at 2000 units daily.  We will plan to repeat her vitamin D level with her next set of labs.    Health Maintenance  She reports having a Pap smear within the last year.  With next set of labs, will add on one-time hepatitis C screening.    Follow Up   Return in about 3 months (around 9/12/2023), or if symptoms worsen or fail to improve.    Patient was given instructions and counseling regarding her condition or for health maintenance advice. Please see specific information pulled into the AVS if appropriate.       This document has been electronically signed by MAITE Stanford   June 12, 2023 13:24 EDT    Dictated Utilizing Dragon Dictation: Part of this note may be an electronic transcription/translation of spoken language to printed text using the Dragon Dictation System.

## 2023-06-26 ENCOUNTER — TELEPHONE (OUTPATIENT)
Dept: FAMILY MEDICINE CLINIC | Facility: CLINIC | Age: 33
End: 2023-06-26

## 2023-06-26 NOTE — TELEPHONE ENCOUNTER
Caller: Rosa Thakkar    Relationship: Self    Best call back number: [phone]     Who are you requesting to speak with (clinical staff, provider,  specific staff member): CLINICAL     What was the call regarding:  THE METFORMIN THAT SHE WAS PUT ON CAUSED HER TO HAVE DIARRHEA AND SEVERE  STOMACH CRAMPS, HEADACHE, NAUSEA  AND FEEL TERRIBLE   SHE TOOK THE FIRST DOSE IN THE MORNING AND THEN DOUBLED IT LAST WEDNESDAY   SHE STOPPED TAKING THE MEDICATION YESTERDAY MORNING 6-25-23    PLEASE CALL AND ADVISE

## 2023-10-26 RX ORDER — ATENOLOL 25 MG/1
25 TABLET ORAL DAILY
Qty: 30 TABLET | Refills: 0 | Status: SHIPPED | OUTPATIENT
Start: 2023-10-26 | End: 2023-10-30 | Stop reason: SDUPTHER

## 2023-10-26 NOTE — TELEPHONE ENCOUNTER
Caller: Rosa Thakkar    Relationship: Self    Best call back number: [phone]     Requested Prescriptions:   Requested Prescriptions     Pending Prescriptions Disp Refills    atenolol (TENORMIN) 25 MG tablet 30 tablet 5     Sig: Take 1 tablet by mouth Daily.        Pharmacy where request should be sent: [id number] - [facility] - [address] AT [facility] [phone] [facility] [phone]      Last office visit with prescribing clinician: 6/12/2023   Last telemedicine visit with prescribing clinician: Visit date not found   Next office visit with prescribing clinician: 10/30/2023     Additional details provided by patient: PATIENT IS NEEDING A REFILL TO LAST UNTIL APPOINTMENT ON 10/30    Does the patient have less than a 3 day supply:  [x] Yes  [] No      Naveen Shoemaker Rep   10/26/23 13:19 EDT

## 2023-10-30 ENCOUNTER — OFFICE VISIT (OUTPATIENT)
Dept: FAMILY MEDICINE CLINIC | Facility: CLINIC | Age: 33
End: 2023-10-30
Payer: COMMERCIAL

## 2023-10-30 VITALS
SYSTOLIC BLOOD PRESSURE: 130 MMHG | HEIGHT: 63 IN | WEIGHT: 261 LBS | DIASTOLIC BLOOD PRESSURE: 90 MMHG | OXYGEN SATURATION: 91 % | HEART RATE: 91 BPM | BODY MASS INDEX: 46.25 KG/M2 | TEMPERATURE: 97.7 F

## 2023-10-30 DIAGNOSIS — E66.01 MORBID OBESITY WITH BMI OF 45.0-49.9, ADULT: ICD-10-CM

## 2023-10-30 DIAGNOSIS — F34.1 PERSISTENT DEPRESSIVE DISORDER: ICD-10-CM

## 2023-10-30 DIAGNOSIS — J30.2 SEASONAL ALLERGIC RHINITIS, UNSPECIFIED TRIGGER: ICD-10-CM

## 2023-10-30 DIAGNOSIS — K21.9 GASTROESOPHAGEAL REFLUX DISEASE, UNSPECIFIED WHETHER ESOPHAGITIS PRESENT: ICD-10-CM

## 2023-10-30 DIAGNOSIS — J02.9 SORE THROAT: Primary | ICD-10-CM

## 2023-10-30 DIAGNOSIS — F41.9 ANXIETY: ICD-10-CM

## 2023-10-30 LAB
EXPIRATION DATE: NORMAL
INTERNAL CONTROL: NORMAL
Lab: NORMAL
S PYO RRNA THROAT QL PROBE: NEGATIVE

## 2023-10-30 RX ORDER — CETIRIZINE HYDROCHLORIDE 10 MG/1
10 TABLET ORAL DAILY
Qty: 30 TABLET | Refills: 5 | Status: SHIPPED | OUTPATIENT
Start: 2023-10-30

## 2023-10-30 RX ORDER — FAMOTIDINE 40 MG/1
40 TABLET, FILM COATED ORAL DAILY
Qty: 30 TABLET | Refills: 5 | Status: SHIPPED | OUTPATIENT
Start: 2023-10-30

## 2023-10-30 RX ORDER — ATENOLOL 25 MG/1
25 TABLET ORAL DAILY
Qty: 30 TABLET | Refills: 5 | Status: SHIPPED | OUTPATIENT
Start: 2023-10-30

## 2023-10-30 RX ORDER — METFORMIN HYDROCHLORIDE 500 MG/1
500 TABLET, EXTENDED RELEASE ORAL
Qty: 30 TABLET | Refills: 2 | Status: SHIPPED | OUTPATIENT
Start: 2023-10-30

## 2023-10-30 RX ORDER — CEPHALEXIN 500 MG/1
500 CAPSULE ORAL 2 TIMES DAILY
Qty: 20 CAPSULE | Refills: 0 | Status: SHIPPED | OUTPATIENT
Start: 2023-10-30

## 2023-10-30 RX ORDER — FLUOXETINE HYDROCHLORIDE 20 MG/1
20 CAPSULE ORAL DAILY
Qty: 30 CAPSULE | Refills: 5 | Status: SHIPPED | OUTPATIENT
Start: 2023-10-30

## 2023-10-30 NOTE — PROGRESS NOTES
Subjective      Chief Complaint  Med Refill, Sore Throat, and Cough    Subjective      Rosa Thakkar is a 33 y.o. female who presents today to [facility] for follow up multiple medical conditions and for sore throat. Past medical history is significant for anxiety, GERD, depression, and obesity.     Sore Throat:   Cough:   She reports her daughter has been diagnosed with strep pharyngitis within the last week.  Over the last 2 to 3 days, she has developed sore throat and a mild dry cough.  She has had some sinus congestion and runny nose.  No fever or chills.    Anxiety:   Depression:   Currently managed on fluoxetine 20 mg daily and reports overall stability.     She has a family history of anxiety, depression, and suicide attempts in both her mother and sister.  She has also been on BuSpar in the past, however, feels that she did not give it appropriate time to help her symptoms.     GERD:  Stable on pepcid 40 mg daily.  No previous history of endoscopies.  She was set up for an EGD in the past, but did not follow through.    Chronic allergic rhinitis:   As long as she takes her cetirizine 10 mg daily, she does fairly well.  Denies any specific allergens.    Palpitations:   She reports a past history of palpitations for which she chronically takes atenolol.  Atenolol was also used to help control her anxiety.  She reports stability on atenolol 25 mg daily.    Morbid obesity per BMI, 46.00:  Prediabetes:   Reports previous weight loss when she was placed on metformin for prediabetes a few years ago.  Her levels improved as did her weight, however, she came off of the medicine and she gained all of her weight back +.  We did recently check on the cost of GLP-1 agonist, however, even with insurance this was going to be $1300 a month.  Busy schedules with work and her children make weight loss difficult.  Given prediabetes with HA1C of 5.7%, we did a trial of metformin to assist  "with glucose control and help with weight loss.  She was unable to tolerate the immediate release formation due to diarrhea and has since stopped it.     Vitamin D deficiency:  Recently noted to have persistent deficiency despite maintenance dose of 1000 units vitamin D daily.  This was increased to 2000 units daily.       Current Outpatient Medications:     atenolol (TENORMIN) 25 MG tablet, Take 1 tablet by mouth Daily., Disp: 30 tablet, Rfl: 5    cetirizine (zyrTEC) 10 MG tablet, Take 1 tablet by mouth Daily., Disp: 30 tablet, Rfl: 5    Cholecalciferol (Vitamin D-3) 25 MCG (1000 UT) capsule, Take 2,000 Units by mouth., Disp: , Rfl:     famotidine (PEPCID) 40 MG tablet, Take 1 tablet by mouth Daily., Disp: 30 tablet, Rfl: 5    FLUoxetine (PROzac) 20 MG capsule, Take 1 capsule by mouth Daily., Disp: 30 capsule, Rfl: 5    cephalexin (Keflex) 500 MG capsule, Take 1 capsule by mouth 2 (Two) Times a Day., Disp: 20 capsule, Rfl: 0    metFORMIN ER (GLUCOPHAGE-XR) 500 MG 24 hr tablet, Take 1 tablet by mouth Daily With Breakfast., Disp: 30 tablet, Rfl: 2      No Known Allergies    Objective     Objective   Vital Signs:  /90   Pulse 91   Temp 97.7 °F (36.5 °C) (Temporal)   Ht 160 cm (62.99\")   Wt 118 kg (261 lb)   SpO2 91%   BMI 46.25 kg/m²   Estimated body mass index is 46.25 kg/m² as calculated from the following:    Height as of this encounter: 160 cm (62.99\").    Weight as of this encounter: 118 kg (261 lb).    Class 3 Severe Obesity (BMI >=40). Obesity-related health conditions include the following: GERD. Obesity is unchanged. BMI is is above average; BMI management plan is completed. We discussed portion control and increasing exercise.    Past Medical History:   Diagnosis Date    Anxiety     Depression     Fatigue     GERD (gastroesophageal reflux disease)      Past Surgical History:   Procedure Laterality Date    CHOLECYSTECTOMY      DENTAL PROCEDURE      OVARIAN CYST REMOVAL      TONSILLECTOMY   "     Social History     Socioeconomic History    Marital status:    Tobacco Use    Smoking status: Former     Packs/day: 1.00     Years: 15.00     Additional pack years: 0.00     Total pack years: 15.00     Types: Cigarettes     Quit date: 3/3/2017     Years since quittin.6    Smokeless tobacco: Never   Vaping Use    Vaping Use: Never used   Substance and Sexual Activity    Alcohol use: No    Drug use: No      Physical Exam  Vitals and nursing note reviewed.   Constitutional:       General: She is not in acute distress.     Appearance: She is well-developed. She is not diaphoretic.   HENT:      Head: Normocephalic and atraumatic.      Right Ear: Tympanic membrane, ear canal and external ear normal. There is no impacted cerumen.      Left Ear: Tympanic membrane, ear canal and external ear normal. There is no impacted cerumen.      Mouth/Throat:      Pharynx: Posterior oropharyngeal erythema present. No oropharyngeal exudate.      Comments: Mild posterior pharyngeal erythema and clear postnasal drip.  No exudates.  Eyes:      General: No scleral icterus.        Right eye: No discharge.         Left eye: No discharge.      Conjunctiva/sclera: Conjunctivae normal.   Neck:      Vascular: No carotid bruit.   Cardiovascular:      Rate and Rhythm: Normal rate and regular rhythm.      Heart sounds: Normal heart sounds. No murmur heard.     No friction rub. No gallop.   Pulmonary:      Effort: Pulmonary effort is normal. No respiratory distress.      Breath sounds: Normal breath sounds. No wheezing or rales.   Chest:      Chest wall: No tenderness.   Musculoskeletal:         General: Normal range of motion.      Cervical back: Normal range of motion and neck supple. No tenderness.   Lymphadenopathy:      Cervical: No cervical adenopathy.   Skin:     General: Skin is warm and dry.      Coloration: Skin is not pale.      Findings: No erythema or rash.   Neurological:      Mental Status: She is alert and oriented to  person, place, and time.   Psychiatric:         Behavior: Behavior normal.        Result Review :  Office Visit on 10/30/2023   Component Date Value Ref Range Status    POC Strep A, Molecular 10/30/2023 Negative  Negative Final    Internal Control 10/30/2023 Passed  Passed Final    Lot Number 10/30/2023 [id number]   Final    Expiration Date 10/30/2023 [id number]   Final      Strep          10/30/2023    12:27   Common Labsle   POC Strep A, Molecular Negative          Assessment / Plan         Assessment   Diagnoses and all orders for this visit:  1. Sore throat  Rapid strep screen is negative.   Encouraged on monitoring her symptoms over the next 2 days with supportive care and continuing her cetirizine.   If worsening sore throat, I did send in a 10 day course of cephalexin given her known exposure to strep pharyngitis.   Return to clinic if no improvement noted or if symptoms are worsening.   - POCT Strep A, molecular    2. Anxiety (Primary)  3. Persistent depressive disorder  Currently managed on fluoxetine 20 mg daily, continue. Refills sent.     4. Gastroesophageal reflux disease, unspecified whether esophagitis present  Continue pepcid. Refills sent.     5. Morbid obesity with BMI of 45.0-49.9, adult (HCC)  6. Prediabetes  Discussed diet, exercise, and cutting out sugary beverages.  Given she did not tolerate metformin immediate release, will try a low dose of the XR form at 500mg daily.   Will also refer to the weight management clinic at [facility].     7. Vitamin D Deficiency:  Continue supplementation at 2000 units daily.  We will plan to repeat her vitamin D level with her next set of labs.    Health Maintenance  She reports having a Pap smear within the last year.  With next set of labs, will add on one-time hepatitis C screening.    Follow Up   Return in about 3 months (around 1/30/2024).    Patient was given instructions and counseling regarding her condition or for health maintenance advice. Please see specific  information pulled into the AVS if appropriate.       This document has been electronically signed by MAITE Stanford   October 31, 2023 15:41 EDT    Dictated Utilizing Dragon Dictation: Part of this note may be an electronic transcription/translation of spoken language to printed text using the Dragon Dictation System.

## 2023-10-30 NOTE — Clinical Note
Please let Rosa know I talked with the weight management clinic and they think they will be able to get her covered. I have sent a referral and they should contact her with an appt.

## 2023-11-29 ENCOUNTER — TELEPHONE (OUTPATIENT)
Dept: FAMILY MEDICINE CLINIC | Facility: CLINIC | Age: 33
End: 2023-11-29

## 2023-11-29 NOTE — TELEPHONE ENCOUNTER
Hub staff attempted to follow warm transfer process and was unsuccessful     Caller: Rosa Thakkar    Relationship to patient: Self    Best call back number: [phone]     Patient is needing: PATIENT RETURNED CALL. NO MESSAGES IN CHART

## 2024-02-01 ENCOUNTER — OFFICE VISIT (OUTPATIENT)
Dept: FAMILY MEDICINE CLINIC | Facility: CLINIC | Age: 34
End: 2024-02-01
Payer: COMMERCIAL

## 2024-02-01 VITALS
OXYGEN SATURATION: 97 % | DIASTOLIC BLOOD PRESSURE: 85 MMHG | SYSTOLIC BLOOD PRESSURE: 130 MMHG | BODY MASS INDEX: 46.78 KG/M2 | WEIGHT: 264 LBS | TEMPERATURE: 98 F | HEIGHT: 63 IN | HEART RATE: 66 BPM

## 2024-02-01 DIAGNOSIS — E66.01 MORBID OBESITY WITH BMI OF 45.0-49.9, ADULT: ICD-10-CM

## 2024-02-01 DIAGNOSIS — F34.1 PERSISTENT DEPRESSIVE DISORDER: ICD-10-CM

## 2024-02-01 DIAGNOSIS — I10 HYPERTENSION, UNSPECIFIED TYPE: ICD-10-CM

## 2024-02-01 DIAGNOSIS — F41.9 ANXIETY: ICD-10-CM

## 2024-02-01 DIAGNOSIS — K21.9 GASTROESOPHAGEAL REFLUX DISEASE, UNSPECIFIED WHETHER ESOPHAGITIS PRESENT: ICD-10-CM

## 2024-02-01 DIAGNOSIS — Z00.00 HEALTH MAINTENANCE EXAMINATION: Primary | ICD-10-CM

## 2024-02-01 DIAGNOSIS — R73.03 PREDIABETES: ICD-10-CM

## 2024-02-01 DIAGNOSIS — J30.2 SEASONAL ALLERGIC RHINITIS, UNSPECIFIED TRIGGER: ICD-10-CM

## 2024-02-01 DIAGNOSIS — E55.9 VITAMIN D DEFICIENCY: ICD-10-CM

## 2024-02-01 PROCEDURE — 99214 OFFICE O/P EST MOD 30 MIN: CPT | Performed by: PHYSICIAN ASSISTANT

## 2024-02-01 RX ORDER — PROPRANOLOL HYDROCHLORIDE 80 MG/1
80 CAPSULE, EXTENDED RELEASE ORAL DAILY
Qty: 30 CAPSULE | Refills: 3 | Status: SHIPPED | OUTPATIENT
Start: 2024-02-01

## 2024-02-01 RX ORDER — FLUOXETINE HYDROCHLORIDE 20 MG/1
20 CAPSULE ORAL DAILY
Qty: 30 CAPSULE | Refills: 5 | Status: SHIPPED | OUTPATIENT
Start: 2024-02-01

## 2024-02-01 RX ORDER — FAMOTIDINE 40 MG/1
40 TABLET, FILM COATED ORAL DAILY
Qty: 30 TABLET | Refills: 5 | Status: SHIPPED | OUTPATIENT
Start: 2024-02-01

## 2024-02-01 RX ORDER — CETIRIZINE HYDROCHLORIDE 10 MG/1
10 TABLET ORAL DAILY
Qty: 30 TABLET | Refills: 5 | Status: SHIPPED | OUTPATIENT
Start: 2024-02-01

## 2024-02-01 NOTE — PROGRESS NOTES
Subjective      Chief Complaint  Follow-up BP, anxiety     Subjective      Rosa Thakkar is a 33 y.o. female who presents today to [facility] for follow up multiple medical conditions and for sore throat. Past medical history is significant for anxiety, GERD, depression, and obesity.     Anxiety:   Depression:   Currently managed on fluoxetine 20 mg daily and reports recently increased anxiety.  No reported depressive symptoms.  She has been taking her medication regularly.    She has a family history of anxiety, depression, and suicide attempts in both her mother and sister.  She has also been on BuSpar in the past, however, feels that she did not give it appropriate time to help her symptoms.     GERD:  Stable on pepcid 40 mg daily.  No previous history of endoscopies.  She was set up for an EGD in the past, but did not follow through.    Chronic allergic rhinitis:   As long as she takes her cetirizine 10 mg daily, she does fairly well.  Denies any specific allergens.    Palpitations:   She reports a past history of palpitations for which she chronically takes atenolol.  Atenolol was also used to help control her anxiety.  She reports stability on atenolol 25 mg daily.    Hypertension:  BP in the office today is 130/85.  At home, it often runs in the 130s over 100.  Currently on atenolol 25 mg daily to help with BP, palpitations, and anxiety.    Morbid obesity per BMI:   Prediabetes:   Reports previous weight loss when she was placed on metformin for prediabetes a few years ago.  Her levels improved as did her weight, however, she came off of the medicine and she gained all of her weight back +.      We did recently check on the cost of GLP-1 agonist, however, even with insurance this was going to be $1300 a month.  She was previously referred to the [facility] weight management clinic, however, did not receive an appointment.  We will check on the status of this.  Busy  "schedules with work and her children make weight loss difficult.  She has been working on cutting back on sugary beverages and regular walking.    Given prediabetes with HA1C of 5.7%, we did a trial of metformin to assist with glucose control and help with weight loss.  She was unable to tolerate the immediate release formation due to diarrhea and has since stopped it.     Vitamin D deficiency:  Currently on vitamin D supplementation at 2000 units daily OTC.      Current Outpatient Medications:     cetirizine (zyrTEC) 10 MG tablet, Take 1 tablet by mouth Daily., Disp: 30 tablet, Rfl: 5    Cholecalciferol (Vitamin D-3) 25 MCG (1000 UT) capsule, Take 2,000 Units by mouth., Disp: , Rfl:     famotidine (PEPCID) 40 MG tablet, Take 1 tablet by mouth Daily., Disp: 30 tablet, Rfl: 5    FLUoxetine (PROzac) 20 MG capsule, Take 1 capsule by mouth Daily., Disp: 30 capsule, Rfl: 5    propranolol LA (Inderal LA) 80 MG 24 hr capsule, Take 1 capsule by mouth Daily., Disp: 30 capsule, Rfl: 3      No Known Allergies    Objective     Objective   Vital Signs:  /85   Pulse 66   Temp 98 °F (36.7 °C) (Temporal)   Ht 160 cm (62.99\")   Wt 120 kg (264 lb)   SpO2 97%   BMI 46.78 kg/m²   Estimated body mass index is 46.78 kg/m² as calculated from the following:    Height as of this encounter: 160 cm (62.99\").    Weight as of this encounter: 120 kg (264 lb).    Class 3 Severe Obesity (BMI >=40). Obesity-related health conditions include the following: GERD. Obesity is unchanged. BMI is is above average; BMI management plan is completed. We discussed portion control and increasing exercise.    Past Medical History:   Diagnosis Date    Anxiety     Depression     Fatigue     GERD (gastroesophageal reflux disease)      Past Surgical History:   Procedure Laterality Date    CHOLECYSTECTOMY      DENTAL PROCEDURE      OVARIAN CYST REMOVAL      TONSILLECTOMY       Social History     Socioeconomic History    Marital status:    Tobacco " Use    Smoking status: Former     Packs/day: 1.00     Years: 15.00     Additional pack years: 0.00     Total pack years: 15.00     Types: Cigarettes     Quit date: 3/3/2017     Years since quittin.9    Smokeless tobacco: Never   Vaping Use    Vaping Use: Never used   Substance and Sexual Activity    Alcohol use: No    Drug use: No      Physical Exam  Vitals and nursing note reviewed.   Constitutional:       General: She is not in acute distress.     Appearance: She is well-developed. She is not diaphoretic.   HENT:      Head: Normocephalic and atraumatic.   Eyes:      General: No scleral icterus.        Right eye: No discharge.         Left eye: No discharge.      Conjunctiva/sclera: Conjunctivae normal.   Cardiovascular:      Rate and Rhythm: Normal rate and regular rhythm.      Heart sounds: Normal heart sounds. No murmur heard.     No friction rub. No gallop.   Pulmonary:      Effort: Pulmonary effort is normal. No respiratory distress.      Breath sounds: Normal breath sounds. No wheezing or rales.   Chest:      Chest wall: No tenderness.   Musculoskeletal:         General: Normal range of motion.      Cervical back: Normal range of motion and neck supple.      Right lower leg: No edema.      Left lower leg: No edema.   Skin:     General: Skin is warm and dry.      Coloration: Skin is not pale.      Findings: No erythema or rash.   Neurological:      Mental Status: She is alert and oriented to person, place, and time.   Psychiatric:         Behavior: Behavior normal.        Result Review :  No visits with results within 3 Month(s) from this visit.   Latest known visit with results is:     Reviewed by MAITE Stanford 24    Lab Results   Component Value Date    WBC 7.35 2023    HGB 14.4 2023    HCT 44.1 2023    MCV 83.1 2023     2023     Lab Results   Component Value Date    GLUCOSE 95 2023    BUN 11 2023    CREATININE 0.73 2023    EGFR 110.1  05/08/2023    BCR 15.1 05/08/2023    K 4.5 05/08/2023    CO2 21.7 (L) 05/08/2023    CALCIUM 9.5 05/08/2023    ALBUMIN 4.3 05/08/2023    BILITOT 0.7 05/08/2023    AST 31 05/08/2023    ALT 54 (H) 05/08/2023     Lab Results   Component Value Date    HGBA1C 5.70 (H) 05/08/2023     Lab Results   Component Value Date    TSH 2.370 05/08/2023     Lab Results   Component Value Date    CHOL 158 05/08/2023     Lab Results   Component Value Date    TRIG 107 05/08/2023     Lab Results   Component Value Date    HDL 47 05/08/2023     Lab Results   Component Value Date    LDL 91 05/08/2023         Assessment / Plan         Assessment   Diagnoses and all orders for this visit:    1. Health maintenance examination  2. Vitamin D deficiency  She is agreeable to repeating routine labs within the next few weeks.  Will notify of results when available and adjust therapy as needed.  We discussed regular exercise, continue cutting back on sugary beverages/pop to promote weight loss.  Will check on the status of her weight management clinic referral as she reports she never received an appointment.  Continue over-the-counter vitamin D supplementation.  Repeat level with next labs.  - CBC & Differential; Future  - Comprehensive Metabolic Panel; Future  - Lipid Panel; Future  - TSH; Future  - Hemoglobin A1c; Future  - Hepatitis C antibody; Future  - Vitamin D 25 hydroxy; Future    3. Anxiety  4. Persistent depressive disorder  5. Hypertension, unspecified type  Reports recent increase in anxiety symptoms despite fluoxetine 20 mg daily and atenolol 25 mg daily.  She has also had a history of intermittent palpitations and BP has recently been elevated at 130s over 100.  Also reports intermittent headaches.  Will change atenolol to propranolol to help with BP, anxiety, palpitations, and headaches.  If continued anxiety, will consider increasing fluoxetine as well.  Monitor BP at home and call if BP continues to run >140/90.  Avoid sodium in the  diet.   - FLUoxetine (PROzac) 20 MG capsule; Take 1 capsule by mouth Daily.  Dispense: 30 capsule; Refill: 5    6. Seasonal allergic rhinitis, unspecified trigger  Continue cetirizine.  - cetirizine (zyrTEC) 10 MG tablet; Take 1 tablet by mouth Daily.  Dispense: 30 tablet; Refill: 5    7. Gastroesophageal reflux disease, unspecified whether esophagitis present  Continue famotidine.  Working on diet and exercise as noted above.  - famotidine (PEPCID) 40 MG tablet; Take 1 tablet by mouth Daily.  Dispense: 30 tablet; Refill: 5    8. Morbid obesity with BMI of 45.0-49.9, adult (HCC)  9. Prediabetes  Discussed diet, exercise, and cutting out sugary beverages.  Given she did not tolerate metformin immediate release, will try a low dose of the XR form at 500mg daily.   Checking on the status of her weight management clinic referral at [facility]    Health Maintenance  She reports having a Pap smear within the last year.  One-time hepatitis C screening pending with next labs.    Follow Up   Return in about 6 weeks (around 3/14/2024) for HTN .    Patient was given instructions and counseling regarding her condition or for health maintenance advice. Please see specific information pulled into the AVS if appropriate.       This document has been electronically signed by MAITE Stanford   February 5, 2024 07:41 EST    Dictated Utilizing Dragon Dictation: Part of this note may be an electronic transcription/translation of spoken language to printed text using the Dragon Dictation System.

## 2024-02-01 NOTE — Clinical Note
Please let Rosa know that I briefly reviewed the Juice plus supplement she had discussed at her appt. It looks like it is a powder form of freeze dried fruits and vegetables aimed at trying to get nutrients in from those foods. I don't see any issue with her taking it. If she is able to get those nutrients in with eating fresh/frozen fruits and vegetables then I'm not sure it would be necessary. Likely good for people who struggle to get those things in within their regular diet. Hope that helps.

## 2024-02-05 PROBLEM — I10 HYPERTENSION: Status: ACTIVE | Noted: 2024-02-05

## 2024-02-06 ENCOUNTER — TELEPHONE (OUTPATIENT)
Dept: FAMILY MEDICINE CLINIC | Facility: CLINIC | Age: 34
End: 2024-02-06
Payer: COMMERCIAL

## 2024-02-06 NOTE — TELEPHONE ENCOUNTER
Caller: Rosa Thakkar    Relationship: Self    Best call back number:     [phone]     Who are you requesting to speak with (clinical staff, provider,  specific staff member): CLINICAL      What was the call regarding: DISCUSS MEDS    Is it okay if the provider responds through MyChart:     NO

## 2024-02-06 NOTE — TELEPHONE ENCOUNTER
Patient has only been taking the new medicine and her blood pressure this morning was 150/105 heart rate was in the 70's. She threw up and got very sweaty. She checked it 2 hours ago and it was 140/101 heart rate was in the 60's. She just checked it just now and her blood pressure is 150/105 pulse is 74.

## 2024-02-06 NOTE — TELEPHONE ENCOUNTER
Pt took it this morning after throwing up. I let her know to wait until tomorrow to start the atenolol back.

## 2024-02-06 NOTE — TELEPHONE ENCOUNTER
Patient told staff she felt like she may have a stomach virus. Reports she has medication available as needed for nausea, may be contributing to elevated BP. Return to clinic if no improvement noted or if symptoms are worsening.

## 2024-03-14 ENCOUNTER — OFFICE VISIT (OUTPATIENT)
Dept: FAMILY MEDICINE CLINIC | Facility: CLINIC | Age: 34
End: 2024-03-14
Payer: COMMERCIAL

## 2024-03-14 VITALS
TEMPERATURE: 97.4 F | HEIGHT: 63 IN | HEART RATE: 90 BPM | SYSTOLIC BLOOD PRESSURE: 125 MMHG | WEIGHT: 261.6 LBS | DIASTOLIC BLOOD PRESSURE: 80 MMHG | BODY MASS INDEX: 46.35 KG/M2 | OXYGEN SATURATION: 99 %

## 2024-03-14 DIAGNOSIS — R52 GENERALIZED BODY ACHES: ICD-10-CM

## 2024-03-14 DIAGNOSIS — J06.9 VIRAL URI: Primary | ICD-10-CM

## 2024-03-14 DIAGNOSIS — J02.9 SORE THROAT: ICD-10-CM

## 2024-03-14 DIAGNOSIS — F41.9 ANXIETY: ICD-10-CM

## 2024-03-14 LAB
B PARAPERT DNA SPEC QL NAA+PROBE: NOT DETECTED
B PERT DNA SPEC QL NAA+PROBE: NOT DETECTED
C PNEUM DNA NPH QL NAA+NON-PROBE: NOT DETECTED
FLUAV SUBTYP SPEC NAA+PROBE: NOT DETECTED
FLUBV RNA ISLT QL NAA+PROBE: NOT DETECTED
HADV DNA SPEC NAA+PROBE: NOT DETECTED
HCOV 229E RNA SPEC QL NAA+PROBE: NOT DETECTED
HCOV HKU1 RNA SPEC QL NAA+PROBE: NOT DETECTED
HCOV NL63 RNA SPEC QL NAA+PROBE: NOT DETECTED
HCOV OC43 RNA SPEC QL NAA+PROBE: NOT DETECTED
HMPV RNA NPH QL NAA+NON-PROBE: NOT DETECTED
HPIV1 RNA ISLT QL NAA+PROBE: NOT DETECTED
HPIV2 RNA SPEC QL NAA+PROBE: NOT DETECTED
HPIV3 RNA NPH QL NAA+PROBE: NOT DETECTED
HPIV4 P GENE NPH QL NAA+PROBE: NOT DETECTED
M PNEUMO IGG SER IA-ACNC: NOT DETECTED
RHINOVIRUS RNA SPEC NAA+PROBE: DETECTED
RSV RNA NPH QL NAA+NON-PROBE: NOT DETECTED
SARS-COV-2 RNA NPH QL NAA+NON-PROBE: NOT DETECTED

## 2024-03-14 PROCEDURE — 0202U NFCT DS 22 TRGT SARS-COV-2: CPT | Performed by: PHYSICIAN ASSISTANT

## 2024-03-14 RX ORDER — PROPRANOLOL HYDROCHLORIDE 80 MG/1
80 CAPSULE, EXTENDED RELEASE ORAL DAILY
Qty: 30 CAPSULE | Refills: 3 | Status: SHIPPED | OUTPATIENT
Start: 2024-03-14

## 2024-03-14 RX ORDER — FLUOXETINE 10 MG/1
30 CAPSULE ORAL DAILY
Qty: 90 CAPSULE | Refills: 2 | Status: SHIPPED | OUTPATIENT
Start: 2024-03-14

## 2024-03-14 NOTE — PROGRESS NOTES
Subjective      Chief Complaint  Follow-up BP, anxiety     Subjective      Rosa Thakkar is a 33 y.o. female who presents today to [facility] for follow up multiple medical conditions and for sore throat. Past medical history is significant for anxiety, GERD, depression, and obesity.     Sore throat:   Nasal congestion:   She reports 2 days of sore throat, generalized bodyaches, and nasal congestion.  Child has been ill with similar symptoms recently.  No fever, chills, rash.  No nausea, vomiting, diarrhea.  Remains on cetirizine 10 mg daily.    Anxiety:   Depression:   Currently managed on fluoxetine 20 mg daily and reports recently increased anxiety.  No reported depressive symptoms.  She has been taking her medication regularly.    She has a family history of anxiety, depression, and suicide attempts in both her mother and sister.  She has also been on BuSpar in the past, however, feels that she did not give it appropriate time to help her symptoms.     GERD:  Stable on pepcid 40 mg daily.  No previous history of endoscopies.  She was set up for an EGD in the past, but did not follow through.    Chronic allergic rhinitis:   As long as she takes her cetirizine 10 mg daily, she does fairly well.  Denies any specific allergens.    Palpitations:   Hypertension:  Recently changed from atenolol to propranolol LA.  Reports palpitations are controlled.  BP in the office today is 125/80, overall improved    Morbid obesity per BMI:   Prediabetes:   Reports previous weight loss when she was placed on metformin for prediabetes a few years ago.  Her levels improved as did her weight, however, she came off of the medicine and she gained all of her weight back +.      We did check on the cost of GLP-1 agonist, however, even with insurance this was going to be $1300/month.  She was previously referred to the [facility], however, did not receive an appointment.  "Busy schedules with work and her children make weight loss difficult.  She has been working on cutting back on sugary beverages and regular walking.    Given prediabetes with HA1C of 5.7%, we did a trial of metformin to assist with glucose control and help with weight loss.  She was unable to tolerate the immediate release or extended release due to diarrhea and has since stopped it. Agreeable for updated labs.     Vitamin D deficiency:  Currently on vitamin D supplementation at 2000 units daily OTC.      Current Outpatient Medications:     cetirizine (zyrTEC) 10 MG tablet, Take 1 tablet by mouth Daily., Disp: 30 tablet, Rfl: 5    Cholecalciferol (Vitamin D-3) 25 MCG (1000 UT) capsule, Take 2,000 Units by mouth., Disp: , Rfl:     famotidine (PEPCID) 40 MG tablet, Take 1 tablet by mouth Daily., Disp: 30 tablet, Rfl: 5    FLUoxetine (PROzac) 10 MG capsule, Take 3 capsules by mouth Daily., Disp: 90 capsule, Rfl: 2    propranolol LA (Inderal LA) 80 MG 24 hr capsule, Take 1 capsule by mouth Daily., Disp: 30 capsule, Rfl: 3      No Known Allergies    Objective     Objective   Vital Signs:  /80   Pulse 90   Temp 97.4 °F (36.3 °C) (Temporal)   Ht 160 cm (62.99\")   Wt 119 kg (261 lb 9.6 oz)   SpO2 99%   BMI 46.35 kg/m²   Estimated body mass index is 46.35 kg/m² as calculated from the following:    Height as of this encounter: 160 cm (62.99\").    Weight as of this encounter: 119 kg (261 lb 9.6 oz).    Class 3 Severe Obesity (BMI >=40). Obesity-related health conditions include the following: GERD. Obesity is unchanged. BMI is is above average; BMI management plan is completed. We discussed portion control and increasing exercise.    Past Medical History:   Diagnosis Date    Anxiety     Depression     Fatigue     GERD (gastroesophageal reflux disease)      Past Surgical History:   Procedure Laterality Date    CHOLECYSTECTOMY      DENTAL PROCEDURE      OVARIAN CYST REMOVAL      TONSILLECTOMY       Social History "     Socioeconomic History    Marital status:    Tobacco Use    Smoking status: Former     Current packs/day: 0.00     Average packs/day: 1 pack/day for 15.0 years (15.0 ttl pk-yrs)     Types: Cigarettes     Start date: 3/3/2002     Quit date: 3/3/2017     Years since quittin.0    Smokeless tobacco: Never   Vaping Use    Vaping status: Never Used   Substance and Sexual Activity    Alcohol use: No    Drug use: No      Physical Exam  Vitals and nursing note reviewed.   Constitutional:       General: She is not in acute distress.     Appearance: She is well-developed. She is not diaphoretic.   HENT:      Head: Normocephalic and atraumatic.      Nose: Congestion present. No rhinorrhea.      Mouth/Throat:      Pharynx: Posterior oropharyngeal erythema present.   Eyes:      General: No scleral icterus.        Right eye: No discharge.         Left eye: No discharge.      Conjunctiva/sclera: Conjunctivae normal.   Cardiovascular:      Rate and Rhythm: Normal rate and regular rhythm.      Heart sounds: Normal heart sounds. No murmur heard.     No friction rub. No gallop.   Pulmonary:      Effort: Pulmonary effort is normal. No respiratory distress.      Breath sounds: Normal breath sounds. No wheezing or rales.   Chest:      Chest wall: No tenderness.   Musculoskeletal:         General: Normal range of motion.      Cervical back: Normal range of motion and neck supple.      Right lower leg: No edema.      Left lower leg: No edema.   Skin:     General: Skin is warm and dry.      Coloration: Skin is not pale.      Findings: No erythema or rash.   Neurological:      Mental Status: She is alert and oriented to person, place, and time.   Psychiatric:         Behavior: Behavior normal.        Result Review :  No visits with results within 3 Month(s) from this visit.   Latest known visit with results is:     Reviewed by MAITE Stanford 24  Lab Results   Component Value Date    WBC 7.35 2023    HGB 14.4  05/08/2023    HCT 44.1 05/08/2023    MCV 83.1 05/08/2023     05/08/2023     Lab Results   Component Value Date    GLUCOSE 95 05/08/2023    BUN 11 05/08/2023    CREATININE 0.73 05/08/2023    EGFR 110.1 05/08/2023    BCR 15.1 05/08/2023    K 4.5 05/08/2023    CO2 21.7 (L) 05/08/2023    CALCIUM 9.5 05/08/2023    ALBUMIN 4.3 05/08/2023    BILITOT 0.7 05/08/2023    AST 31 05/08/2023    ALT 54 (H) 05/08/2023     Lab Results   Component Value Date    HGBA1C 5.70 (H) 05/08/2023     Lab Results   Component Value Date    TSH 2.370 05/08/2023     Lab Results   Component Value Date    CHOL 158 05/08/2023     Lab Results   Component Value Date    TRIG 107 05/08/2023     Lab Results   Component Value Date    HDL 47 05/08/2023     Lab Results   Component Value Date    LDL 91 05/08/2023           Assessment / Plan         Assessment   Diagnoses and all orders for this visit:    Viral URI, body aches,   Sore throat   Respiratory panel completed and has since returned positive for human rhinovirus.  Continue supportive care with rest, adequate oral fluid intake, symptomatic care.   Continue cetirizine 10 mg daily.  Consider OTC nasal spray.  Return to clinic if no improvement noted or if symptoms are worsening.     3. Anxiety  4. Persistent depressive disorder  5. Hypertension, unspecified type  BP improved with changing atenolol to propranolol LA, continue at current dose.  She reports some persistent anxiety with fluoxetine.  Will increase to 30 mg daily.  Return to clinic if no improvement noted or if symptoms are worsening.     6. Seasonal allergic rhinitis, unspecified trigger  Continue cetirizine.    7. Gastroesophageal reflux disease, unspecified whether esophagitis present  Continue famotidine.  Working on diet and exercise as noted above.    8. Morbid obesity with BMI of 45.0-49.9, adult (HCC)  9. Prediabetes  Discussed diet, exercise, and cutting out sugary beverages.  Given she did not tolerate metformin IR or XR.      Health Maintenance  She reports having a Pap smear within the last year.  One-time hepatitis C screening pending with next labs.    Follow Up   Return in about 6 weeks (around 4/25/2024).    Patient was given instructions and counseling regarding her condition or for health maintenance advice. Please see specific information pulled into the AVS if appropriate.       This document has been electronically signed by MAITE Stanford   March 25, 2024 07:44 EDT    Dictated Utilizing Dragon Dictation: Part of this note may be an electronic transcription/translation of spoken language to printed text using the Dragon Dictation System.

## 2024-05-20 ENCOUNTER — OFFICE VISIT (OUTPATIENT)
Dept: FAMILY MEDICINE CLINIC | Facility: CLINIC | Age: 34
End: 2024-05-20
Payer: COMMERCIAL

## 2024-05-20 VITALS
BODY MASS INDEX: 46.42 KG/M2 | OXYGEN SATURATION: 98 % | DIASTOLIC BLOOD PRESSURE: 82 MMHG | RESPIRATION RATE: 16 BRPM | HEART RATE: 86 BPM | SYSTOLIC BLOOD PRESSURE: 124 MMHG | WEIGHT: 262 LBS | HEIGHT: 63 IN

## 2024-05-20 DIAGNOSIS — J02.9 SORE THROAT: Primary | ICD-10-CM

## 2024-05-20 DIAGNOSIS — L30.9 DERMATITIS: ICD-10-CM

## 2024-05-20 LAB
EXPIRATION DATE: NORMAL
INTERNAL CONTROL: NORMAL
Lab: NORMAL
S PYO AG THROAT QL: NEGATIVE

## 2024-05-20 PROCEDURE — 96372 THER/PROPH/DIAG INJ SC/IM: CPT | Performed by: PHYSICIAN ASSISTANT

## 2024-05-20 PROCEDURE — 99213 OFFICE O/P EST LOW 20 MIN: CPT | Performed by: PHYSICIAN ASSISTANT

## 2024-05-20 PROCEDURE — 87880 STREP A ASSAY W/OPTIC: CPT | Performed by: PHYSICIAN ASSISTANT

## 2024-05-20 RX ORDER — NYSTATIN 100000 U/G
1 CREAM TOPICAL 2 TIMES DAILY
Qty: 30 G | Refills: 1 | Status: SHIPPED | OUTPATIENT
Start: 2024-05-20

## 2024-05-20 RX ORDER — DEXAMETHASONE SODIUM PHOSPHATE 4 MG/ML
8 INJECTION, SOLUTION INTRA-ARTICULAR; INTRALESIONAL; INTRAMUSCULAR; INTRAVENOUS; SOFT TISSUE ONCE
Status: COMPLETED | OUTPATIENT
Start: 2024-05-20 | End: 2024-05-20

## 2024-05-20 RX ADMIN — DEXAMETHASONE SODIUM PHOSPHATE 8 MG: 4 INJECTION, SOLUTION INTRA-ARTICULAR; INTRALESIONAL; INTRAMUSCULAR; INTRAVENOUS; SOFT TISSUE at 15:23

## 2024-05-20 NOTE — PROGRESS NOTES
"    Subjective        Chief Complaint  Rash    Subjective      Rosa Thakkar is a 34 y.o. female who presents today to [facility] for Rash. Past medical history is significant for anxiety, GERD, depression, and obesity.     Rash:  She reports about 3 days of an itchy rash on her neck, the back of her legs, and on her buttocks. Denies any new soaps, laundry detergents, shampoos, perfumes, etc. No new medications prescribed or OTC. Denies any known plant/weed exposure. No known tick bites. She does have a dog that they have picked several ticks off of lately.     This morning, she work up feeling poorly with sore throat and generalized fatigue. No fever or chills. No nausea or vomiting. Family member had strep recently and she had contact with them last week.       Current Outpatient Medications:     cetirizine (zyrTEC) 10 MG tablet, Take 1 tablet by mouth Daily., Disp: 30 tablet, Rfl: 5    Cholecalciferol (Vitamin D-3) 25 MCG (1000 UT) capsule, Take 2,000 Units by mouth., Disp: , Rfl:     famotidine (PEPCID) 40 MG tablet, Take 1 tablet by mouth Daily., Disp: 30 tablet, Rfl: 5    FLUoxetine (PROzac) 10 MG capsule, Take 3 capsules by mouth Daily., Disp: 90 capsule, Rfl: 2    propranolol LA (Inderal LA) 80 MG 24 hr capsule, Take 1 capsule by mouth Daily., Disp: 30 capsule, Rfl: 3    hydrocortisone 2.5 % cream, Apply 1 Application topically to the appropriate area as directed 2 (Two) Times a Day., Disp: 28 g, Rfl: 1    nystatin (MYCOSTATIN) 185622 UNIT/GM cream, Apply 1 Application topically to the appropriate area as directed 2 (Two) Times a Day., Disp: 30 g, Rfl: 1    Current Facility-Administered Medications:     dexAMETHasone (DECADRON) injection 8 mg, 8 mg, Intramuscular, Once, Rosamaria Vail PA      No Known Allergies    Objective     Objective   Vital Signs:  /82   Pulse 86   Resp 16   Ht 160 cm (62.99\")   Wt 119 kg (262 lb)   SpO2 98%   BMI 46.42 kg/m² " "  Estimated body mass index is 46.42 kg/m² as calculated from the following:    Height as of this encounter: 160 cm (62.99\").    Weight as of this encounter: 119 kg (262 lb).    Past Medical History:   Diagnosis Date    Anxiety     Depression     Fatigue     GERD (gastroesophageal reflux disease)      Past Surgical History:   Procedure Laterality Date    CHOLECYSTECTOMY      DENTAL PROCEDURE      OVARIAN CYST REMOVAL      TONSILLECTOMY       Social History     Socioeconomic History    Marital status:    Tobacco Use    Smoking status: Former     Current packs/day: 0.00     Average packs/day: 1 pack/day for 15.0 years (15.0 ttl pk-yrs)     Types: Cigarettes     Start date: 3/3/2002     Quit date: 3/3/2017     Years since quittin.2    Smokeless tobacco: Never   Vaping Use    Vaping status: Never Used   Substance and Sexual Activity    Alcohol use: No    Drug use: No      Physical Exam  Vitals and nursing note reviewed.   Constitutional:       General: She is not in acute distress.     Appearance: She is well-developed. She is not diaphoretic.   HENT:      Head: Normocephalic and atraumatic.      Right Ear: Tympanic membrane normal.      Nose: No congestion or rhinorrhea.      Mouth/Throat:      Pharynx: Posterior oropharyngeal erythema present. No oropharyngeal exudate.   Eyes:      General: No scleral icterus.        Right eye: No discharge.         Left eye: No discharge.      Conjunctiva/sclera: Conjunctivae normal.   Cardiovascular:      Rate and Rhythm: Normal rate and regular rhythm.      Heart sounds: Normal heart sounds. No murmur heard.     No friction rub. No gallop.   Pulmonary:      Effort: Pulmonary effort is normal. No respiratory distress.      Breath sounds: Normal breath sounds. No wheezing or rales.   Chest:      Chest wall: No tenderness.   Musculoskeletal:         General: Normal range of motion.      Cervical back: Normal range of motion and neck supple.   Lymphadenopathy:      Cervical: " No cervical adenopathy.   Skin:     General: Skin is warm and dry.      Coloration: Skin is not pale.      Findings: Rash (Small maculopapular rashes, mostly in the skin folds of the buttocks, popliteal fossa. One patch along the medial clavicle on the right.) present. No erythema.   Neurological:      Mental Status: She is alert and oriented to person, place, and time.   Psychiatric:         Behavior: Behavior normal.       Result Review :  The following data was reviewed by: MAITE Stanford on 05/20/2024:  Hemoglobin A1C   Date Value Ref Range Status   05/08/2023 5.70 (H) 4.80 - 5.60 % Final     TSH   Date Value Ref Range Status   05/08/2023 2.370 0.270 - 4.200 uIU/mL Final     HDL Cholesterol   Date Value Ref Range Status   05/08/2023 47 40 - 60 mg/dL Final     LDL Cholesterol    Date Value Ref Range Status   05/08/2023 91 0 - 100 mg/dL Final     Triglycerides   Date Value Ref Range Status   05/08/2023 107 0 - 150 mg/dL Final           Assessment / Plan         Assessment   Diagnoses and all orders for this visit:    1. Sore throat (Primary)  Rapid strep screen is negative.  Continue to monitor symptoms and provide supportive/symptomatic care over the next few days.  May be related to postnasal drip versus early viral URI.  Continue cetirizine 10mg daily.   -     POCT rapid strep A    2. Dermatitis  Atypical for a viral exanthem given that it mostly involves flexor surfaces and skin folds.  She received a dose of IM Decadron 8mg x 1 in the office today to help with the itch.  Will add topical nystatin and hydrocortisone cream 2.5%.  Mix in one-to-one ratio and apply twice daily.  Encouraged on keeping skin folds clean, pat dry after bathing or use of a hair dryer on a cool setting.  Return to clinic if no improvement noted or if symptoms are worsening.   -     nystatin (MYCOSTATIN) [id number] UNIT/GM cream; Apply 1 Application topically to the appropriate area as directed 2 (Two) Times a Day.  Dispense: 30 g;  Refill: 1  -     hydrocortisone 2.5 % cream; Apply 1 Application topically to the appropriate area as directed 2 (Two) Times a Day.  Dispense: 28 g; Refill: 1  -     dexAMETHasone (DECADRON) injection 8 mg       New Medications Ordered This Visit   Medications    nystatin (MYCOSTATIN) [id number] UNIT/GM cream     Sig: Apply 1 Application topically to the appropriate area as directed 2 (Two) Times a Day.     Dispense:  30 g     Refill:  1    hydrocortisone 2.5 % cream     Sig: Apply 1 Application topically to the appropriate area as directed 2 (Two) Times a Day.     Dispense:  28 g     Refill:  1    dexAMETHasone (DECADRON) injection 8 mg     Follow Up   Return if symptoms worsen or fail to improve.    Patient was given instructions and counseling regarding her condition or for health maintenance advice. Please see specific information pulled into the AVS if appropriate.       This document has been electronically signed by MAITE Stanford   May 20, 2024 15:15 EDT    Dictated Utilizing Dragon Dictation: Part of this note may be an electronic transcription/translation of spoken language to printed text using the Dragon Dictation System.

## 2024-06-27 DIAGNOSIS — K21.9 GASTROESOPHAGEAL REFLUX DISEASE, UNSPECIFIED WHETHER ESOPHAGITIS PRESENT: ICD-10-CM

## 2024-06-27 RX ORDER — FAMOTIDINE 40 MG/1
40 TABLET, FILM COATED ORAL DAILY
Qty: 30 TABLET | Refills: 5 | Status: SHIPPED | OUTPATIENT
Start: 2024-06-27

## 2024-07-11 RX ORDER — FLUOXETINE 10 MG/1
30 CAPSULE ORAL DAILY
Qty: 90 CAPSULE | Refills: 1 | Status: SHIPPED | OUTPATIENT
Start: 2024-07-11

## 2024-07-11 NOTE — TELEPHONE ENCOUNTER
PATIENT CALLING TO LET DR COTE KNOW SHE'S WILLING TO COME IN AND HAVE LABS DRAWN IF NEED BE FOR THIS REFILL TO BE SUBMITTED.

## 2024-09-16 RX ORDER — FLUOXETINE 10 MG/1
30 CAPSULE ORAL DAILY
Qty: 90 CAPSULE | Refills: 1 | Status: SHIPPED | OUTPATIENT
Start: 2024-09-16

## 2024-09-25 ENCOUNTER — TELEMEDICINE (OUTPATIENT)
Dept: FAMILY MEDICINE CLINIC | Facility: CLINIC | Age: 34
End: 2024-09-25
Payer: COMMERCIAL

## 2024-09-25 DIAGNOSIS — J01.00 ACUTE NON-RECURRENT MAXILLARY SINUSITIS: Primary | ICD-10-CM

## 2024-09-25 PROCEDURE — 99213 OFFICE O/P EST LOW 20 MIN: CPT | Performed by: PHYSICIAN ASSISTANT

## 2024-09-25 RX ORDER — FLUCONAZOLE 150 MG/1
150 TABLET ORAL DAILY
Qty: 3 TABLET | Refills: 0 | Status: SHIPPED | OUTPATIENT
Start: 2024-09-25 | End: 2024-09-28

## 2024-10-24 RX ORDER — PROPRANOLOL HYDROCHLORIDE 80 MG/1
80 CAPSULE, EXTENDED RELEASE ORAL DAILY
Qty: 30 CAPSULE | Refills: 3 | Status: SHIPPED | OUTPATIENT
Start: 2024-10-24

## 2024-11-21 RX ORDER — FLUOXETINE 10 MG/1
30 CAPSULE ORAL DAILY
Qty: 90 CAPSULE | Refills: 1 | Status: SHIPPED | OUTPATIENT
Start: 2024-11-21

## 2024-11-25 DIAGNOSIS — J30.2 SEASONAL ALLERGIC RHINITIS, UNSPECIFIED TRIGGER: ICD-10-CM

## 2024-11-25 RX ORDER — CETIRIZINE HYDROCHLORIDE 10 MG/1
10 TABLET ORAL DAILY
Qty: 30 TABLET | Refills: 5 | Status: SHIPPED | OUTPATIENT
Start: 2024-11-25

## 2024-12-03 ENCOUNTER — OFFICE VISIT (OUTPATIENT)
Dept: FAMILY MEDICINE CLINIC | Facility: CLINIC | Age: 34
End: 2024-12-03
Payer: COMMERCIAL

## 2024-12-03 VITALS
BODY MASS INDEX: 46.6 KG/M2 | WEIGHT: 263 LBS | DIASTOLIC BLOOD PRESSURE: 90 MMHG | SYSTOLIC BLOOD PRESSURE: 130 MMHG | OXYGEN SATURATION: 97 % | TEMPERATURE: 97.8 F | HEIGHT: 63 IN | HEART RATE: 87 BPM

## 2024-12-03 DIAGNOSIS — J20.9 ACUTE BRONCHITIS, UNSPECIFIED ORGANISM: Primary | ICD-10-CM

## 2024-12-03 DIAGNOSIS — J06.9 UPPER RESPIRATORY TRACT INFECTION, UNSPECIFIED TYPE: ICD-10-CM

## 2024-12-03 PROCEDURE — 0202U NFCT DS 22 TRGT SARS-COV-2: CPT | Performed by: PHYSICIAN ASSISTANT

## 2024-12-03 RX ORDER — ALBUTEROL SULFATE 90 UG/1
2 INHALANT RESPIRATORY (INHALATION) EVERY 4 HOURS PRN
Qty: 8 G | Refills: 0 | Status: SHIPPED | OUTPATIENT
Start: 2024-12-03

## 2024-12-03 RX ORDER — METHYLPREDNISOLONE ACETATE 80 MG/ML
80 INJECTION, SUSPENSION INTRA-ARTICULAR; INTRALESIONAL; INTRAMUSCULAR; SOFT TISSUE ONCE
Status: COMPLETED | OUTPATIENT
Start: 2024-12-03 | End: 2024-12-03

## 2024-12-03 RX ORDER — AZITHROMYCIN 250 MG/1
TABLET, FILM COATED ORAL
Qty: 6 TABLET | Refills: 0 | Status: SHIPPED | OUTPATIENT
Start: 2024-12-03

## 2024-12-03 RX ORDER — BENZONATATE 100 MG/1
100 CAPSULE ORAL 3 TIMES DAILY PRN
Qty: 30 CAPSULE | Refills: 0 | Status: SHIPPED | OUTPATIENT
Start: 2024-12-03

## 2024-12-03 RX ADMIN — METHYLPREDNISOLONE ACETATE 80 MG: 80 INJECTION, SUSPENSION INTRA-ARTICULAR; INTRALESIONAL; INTRAMUSCULAR; SOFT TISSUE at 14:00

## 2024-12-03 NOTE — PROGRESS NOTES
Subjective        Chief Complaint  Cough (Chest congestion)    Subjective      Rosa Thakkar is a 34 y.o. female who presents today to [facility] for Cough (Chest congestion).     Cough (Chest congestion):  She reports about 2 months of persistent cough and congestion since she had COVID-19.  A few weeks after her initial diagnosis, she developed a sinus infection.  She is unsure what she was treated with at a local urgent care.  Symptoms did improve, however, cough has persisted.  She has had a mild sore throat recently as well as runny nose.  Denies any fever or chills.  Cough is mostly dry.  Has not appreciated any wheezing.  Her child recently started school and they have had multiple illnesses in the home over the last few months.      Current Outpatient Medications:     cetirizine (zyrTEC) 10 MG tablet, TAKE 1 TABLET BY MOUTH EVERY DAY, Disp: 30 tablet, Rfl: 5    Cholecalciferol (Vitamin D-3) 25 MCG (1000 UT) capsule, Take 2,000 Units by mouth., Disp: , Rfl:     famotidine (PEPCID) 40 MG tablet, TAKE 1 TABLET BY MOUTH DAILY, Disp: 30 tablet, Rfl: 5    FLUoxetine (PROzac) 10 MG capsule, TAKE 3 CAPSULES BY MOUTH DAILY, Disp: 90 capsule, Rfl: 1    propranolol LA (INDERAL LA) 80 MG 24 hr capsule, TAKE 1 CAPSULE BY MOUTH DAILY, Disp: 30 capsule, Rfl: 3    albuterol sulfate  (90 Base) MCG/ACT inhaler, Inhale 2 puffs Every 4 (Four) Hours As Needed for Wheezing or Shortness of Air., Disp: 8 g, Rfl: 0    azithromycin (Zithromax Z-Saeid) 250 MG tablet, Take 2 tablets by mouth on day 1, then 1 tablet daily on days 2-5, Disp: 6 tablet, Rfl: 0    benzonatate (TESSALON) 100 MG capsule, Take 1 capsule by mouth 3 (Three) Times a Day As Needed for Cough., Disp: 30 capsule, Rfl: 0    hydrocortisone 2.5 % cream, Apply 1 Application topically to the appropriate area as directed 2 (Two) Times a Day. (Patient not taking: Reported on 12/3/2024), Disp: 28 g, Rfl: 1    nystatin (MYCOSTATIN)  "[id number] UNIT/GM cream, Apply 1 Application topically to the appropriate area as directed 2 (Two) Times a Day. (Patient not taking: Reported on 12/3/2024), Disp: 30 g, Rfl: 1    Current Facility-Administered Medications:     methylPREDNISolone acetate (DEPO-medrol) injection 80 mg, 80 mg, Intramuscular, Once, Rosamaria Vail PA      No Known Allergies    Objective     Objective   Vital Signs:  /90   Pulse 87   Temp 97.8 °F (36.6 °C) (Oral)   Ht 160 cm (62.99\")   Wt 119 kg (263 lb)   SpO2 97%   BMI 46.60 kg/m²   Estimated body mass index is 46.6 kg/m² as calculated from the following:    Height as of this encounter: 160 cm (62.99\").    Weight as of this encounter: 119 kg (263 lb).    Past Medical History:   Diagnosis Date    Anxiety     Depression     Fatigue     GERD (gastroesophageal reflux disease)      Past Surgical History:   Procedure Laterality Date    CHOLECYSTECTOMY      DENTAL PROCEDURE      OVARIAN CYST REMOVAL      TONSILLECTOMY       Social History     Socioeconomic History    Marital status:    Tobacco Use    Smoking status: Former     Current packs/day: 0.00     Average packs/day: 1 pack/day for 15.0 years (15.0 ttl pk-yrs)     Types: Cigarettes     Start date: 3/3/2002     Quit date: 3/3/2017     Years since quittin.7    Smokeless tobacco: Never   Vaping Use    Vaping status: Never Used   Substance and Sexual Activity    Alcohol use: No    Drug use: No      Physical Exam  Vitals and nursing note reviewed.   Constitutional:       General: She is not in acute distress.     Appearance: She is well-developed. She is not diaphoretic.   HENT:      Head: Normocephalic and atraumatic.   Eyes:      General: No scleral icterus.        Right eye: No discharge.         Left eye: No discharge.      Conjunctiva/sclera: Conjunctivae normal.   Cardiovascular:      Rate and Rhythm: Normal rate and regular rhythm.      Heart sounds: Normal heart sounds. No murmur heard.     No friction " rub. No gallop.   Pulmonary:      Effort: Pulmonary effort is normal. No respiratory distress.      Breath sounds: Normal breath sounds. No wheezing or rales.   Chest:      Chest wall: No tenderness.   Musculoskeletal:         General: Normal range of motion.      Cervical back: Normal range of motion and neck supple.   Skin:     General: Skin is warm and dry.      Coloration: Skin is not pale.      Findings: No erythema or rash.   Neurological:      Mental Status: She is alert and oriented to person, place, and time.   Psychiatric:         Behavior: Behavior normal.        Result Review :  The following data was reviewed by: MAITE Stanford on 12/03/2024:  Hemoglobin A1C   Date Value Ref Range Status   05/08/2023 5.70 (H) 4.80 - 5.60 % Final     TSH   Date Value Ref Range Status   05/08/2023 2.370 0.270 - 4.200 uIU/mL Final     HDL Cholesterol   Date Value Ref Range Status   05/08/2023 47 40 - 60 mg/dL Final     LDL Cholesterol    Date Value Ref Range Status   05/08/2023 91 0 - 100 mg/dL Final     Triglycerides   Date Value Ref Range Status   05/08/2023 107 0 - 150 mg/dL Final           Assessment / Plan         Assessment   Diagnoses and all orders for this visit:    1. Acute bronchitis, unspecified organism (Primary)  2. Upper respiratory tract infection, unspecified type  Given cough has persisted for several weeks, will add azithromycin Dosepak, use as directed.  She received a dose of IM methylprednisolone 80 mg x 1 in the office today.  Add albuterol inhaler 2 puffs every 4 hours as needed.  Add Tessalon Perles 100 mg 3 times daily as needed.  If symptoms fail to improve, will consider chest x-ray.  Return to clinic if no improvement noted or if symptoms are worsening.   -     Respiratory Panel PCR w/COVID-19(SARS-CoV-2) JAI/KADY/PRICILLA/PAD/COR/BONNIE In-House, NP Swab in UTM/VTM, 2 HR TAT - Swab, Nasopharynx  -     azithromycin (Zithromax Z-Saeid) 250 MG tablet; Take 2 tablets by mouth on day 1, then 1 tablet  daily on days 2-5  Dispense: 6 tablet; Refill: 0  -     methylPREDNISolone acetate (DEPO-medrol) injection 80 mg  -     benzonatate (TESSALON) 100 MG capsule; Take 1 capsule by mouth 3 (Three) Times a Day As Needed for Cough.  Dispense: 30 capsule; Refill: 0  -     albuterol sulfate  (90 Base) MCG/ACT inhaler; Inhale 2 puffs Every 4 (Four) Hours As Needed for Wheezing or Shortness of Air.  Dispense: 8 g; Refill: 0       New Medications Ordered This Visit   Medications    benzonatate (TESSALON) 100 MG capsule     Sig: Take 1 capsule by mouth 3 (Three) Times a Day As Needed for Cough.     Dispense:  30 capsule     Refill:  0    azithromycin (Zithromax Z-Saeid) 250 MG tablet     Sig: Take 2 tablets by mouth on day 1, then 1 tablet daily on days 2-5     Dispense:  6 tablet     Refill:  0    methylPREDNISolone acetate (DEPO-medrol) injection 80 mg    albuterol sulfate  (90 Base) MCG/ACT inhaler     Sig: Inhale 2 puffs Every 4 (Four) Hours As Needed for Wheezing or Shortness of Air.     Dispense:  8 g     Refill:  0     Follow Up   Return if symptoms worsen or fail to improve.    Patient was given instructions and counseling regarding her condition or for health maintenance advice. Please see specific information pulled into the AVS if appropriate.       This document has been electronically signed by MAITE Stanford   December 3, 2024 13:28 EST    Dictated Utilizing Dragon Dictation: Part of this note may be an electronic transcription/translation of spoken language to printed text using the Dragon Dictation System.

## 2025-01-20 RX ORDER — FLUOXETINE 10 MG/1
30 CAPSULE ORAL DAILY
Qty: 90 CAPSULE | Refills: 1 | Status: SHIPPED | OUTPATIENT
Start: 2025-01-20

## 2025-02-24 RX ORDER — PROPRANOLOL HYDROCHLORIDE 80 MG/1
80 CAPSULE, EXTENDED RELEASE ORAL DAILY
Qty: 30 CAPSULE | Refills: 3 | Status: SHIPPED | OUTPATIENT
Start: 2025-02-24

## 2025-03-28 ENCOUNTER — OFFICE VISIT (OUTPATIENT)
Dept: FAMILY MEDICINE CLINIC | Facility: CLINIC | Age: 35
End: 2025-03-28
Payer: COMMERCIAL

## 2025-03-28 VITALS
TEMPERATURE: 98.5 F | HEART RATE: 79 BPM | SYSTOLIC BLOOD PRESSURE: 122 MMHG | HEIGHT: 63 IN | WEIGHT: 267.4 LBS | BODY MASS INDEX: 47.38 KG/M2 | OXYGEN SATURATION: 97 % | DIASTOLIC BLOOD PRESSURE: 82 MMHG

## 2025-03-28 DIAGNOSIS — J06.9 UPPER RESPIRATORY TRACT INFECTION, UNSPECIFIED TYPE: Primary | ICD-10-CM

## 2025-03-28 DIAGNOSIS — K21.9 GASTROESOPHAGEAL REFLUX DISEASE, UNSPECIFIED WHETHER ESOPHAGITIS PRESENT: Chronic | ICD-10-CM

## 2025-03-28 DIAGNOSIS — F34.1 PERSISTENT DEPRESSIVE DISORDER: Chronic | ICD-10-CM

## 2025-03-28 RX ORDER — FLUCONAZOLE 150 MG/1
150 TABLET ORAL DAILY
Qty: 2 TABLET | Refills: 0 | Status: SHIPPED | OUTPATIENT
Start: 2025-03-28

## 2025-03-28 RX ORDER — CEFDINIR 300 MG/1
600 CAPSULE ORAL DAILY
Qty: 20 CAPSULE | Refills: 0 | Status: SHIPPED | OUTPATIENT
Start: 2025-03-28 | End: 2025-04-07

## 2025-03-28 RX ORDER — PSEUDOEPHEDRINE HYDROCHLORIDE 60 MG/1
60 TABLET, FILM COATED ORAL 2 TIMES DAILY PRN
Qty: 14 TABLET | Refills: 0 | Status: SHIPPED | OUTPATIENT
Start: 2025-03-28

## 2025-03-28 NOTE — PROGRESS NOTES
"Chief Complaint -sore throat    History of Present Illness -     Rosa Thakkar is a 34 y.o. female.     Sore throat-  Patient complains of sudden onset of severe sore throat, painful swallowing, nasal congestion, sneezing and fatigue that began 3 days ago.  Minimal relief with Zyrtec.    Influenza testing today negative    Gastroesophageal reflux disease-  Stable with famotidine and dietary modification    Depression-  Stable with fluoxetine.  She denies any hallucinations, SI or HI      The following portions of the patient's history were reviewed and updated as appropriate: allergies, current medications, past family history, past medical history, past social history, past surgical history and problem list.        Objective  Vital signs:  /82 (BP Location: Left arm, Patient Position: Sitting, Cuff Size: Adult)   Pulse 79   Temp 98.5 °F (36.9 °C) (Temporal)   Ht 160 cm (62.99\")   Wt 121 kg (267 lb 6.4 oz)   SpO2 97%   BMI 47.38 kg/m²     Physical Exam  Vitals and nursing note reviewed.   Constitutional:       General: She is not in acute distress.     Appearance: Normal appearance. She is well-developed. She is obese. She is not diaphoretic.   HENT:      Head: Normocephalic and atraumatic.      Right Ear: Tympanic membrane, ear canal and external ear normal.      Left Ear: Tympanic membrane, ear canal and external ear normal.      Nose: Congestion present.      Mouth/Throat:      Mouth: Mucous membranes are moist.      Pharynx: Posterior oropharyngeal erythema present. No oropharyngeal exudate.   Neck:      Thyroid: No thyromegaly.   Cardiovascular:      Rate and Rhythm: Normal rate and regular rhythm.      Heart sounds: Normal heart sounds. No murmur heard.  Pulmonary:      Effort: Pulmonary effort is normal.      Breath sounds: Normal breath sounds. No wheezing or rales.   Musculoskeletal:      Cervical back: Normal range of motion and neck supple.   Lymphadenopathy:      Cervical: Cervical adenopathy " present.   Skin:     General: Skin is warm and dry.      Findings: No rash.   Neurological:      Mental Status: She is alert and oriented to person, place, and time.   Psychiatric:         Mood and Affect: Mood normal.         Behavior: Behavior normal.         Thought Content: Thought content normal.         The following data was reviewed by Beatrice Morgan PA-C:    Data reviewed : Flu test negative today      Lab Results   Component Value Date    BUN 11 05/08/2023    CREATININE 0.73 05/08/2023    EGFR 110.1 05/08/2023    ALT 54 (H) 05/08/2023    AST 31 05/08/2023    WBC 7.35 05/08/2023    HGB 14.4 05/08/2023    HCT 44.1 05/08/2023     05/08/2023    CHOL 158 05/08/2023    TRIG 107 05/08/2023    HDL 47 05/08/2023    LDL 91 05/08/2023    TSH 2.370 05/08/2023    HGBA1C 5.70 (H) 05/08/2023           Assessment & Plan     Diagnoses and all orders for this visit:    1. Upper respiratory tract infection, unspecified type (Primary)  Comments:  Symptomatic care advised  Orders:  -     cefdinir (OMNICEF) 300 MG capsule; Take 2 capsules by mouth Daily for 10 days.  Dispense: 20 capsule; Refill: 0  -     pseudoephedrine (SUDAFED) 60 MG tablet; Take 1 tablet by mouth 2 (Two) Times a Day As Needed for Congestion.  Dispense: 14 tablet; Refill: 0    2. Gastroesophageal reflux disease, unspecified whether esophagitis present  Comments:  Continue famotidine  Advised that postnasal drainage could affect reflux so advised sleeping with extra pillows at night    3. Persistent depressive disorder  Comments:  Continue fluoxetine    Other orders  -     fluconazole (Diflucan) 150 MG tablet; Take 1 tablet by mouth Daily.  Dispense: 2 tablet; Refill: 0        Class 3 Severe Obesity (BMI >=40). Obesity-related health conditions include the following: hypertension and GERD. Obesity is unchanged. BMI is is above average; BMI management plan is completed. We discussed portion control and increasing exercise.          Patient was given  instructions and counseling regarding his condition or for health maintenance advice. Please see specific information pulled into the AVS if appropriate      This document has been electronically signed by:  Beatrice Morgan PA-C

## 2025-03-28 NOTE — PATIENT INSTRUCTIONS
"Fat and Cholesterol Restricted Eating Plan  Getting too much fat and cholesterol in your diet may cause health problems. Choosing the right foods helps keep your fat and cholesterol at normal levels. This can keep you from getting certain diseases.  Your doctor may recommend an eating plan that includes:  Total fat: ______% or less of total calories a day. This is ______g of fat a day.  Saturated fat: ______% or less of total calories a day. This is ______g of saturated fat a day.  Cholesterol: less than _________mg a day.  Fiber: ______g a day.  What are tips for following this plan?  General tips  Work with your doctor to lose weight if you need to.  Avoid:  Foods with added sugar.  Fried foods.  Foods with trans fat or partially hydrogenated oils. This includes some margarines and baked goods.  If you drink alcohol:  Limit how much you have to:  0-1 drink a day for women who are not pregnant.  0-2 drinks a day for men.  Know how much alcohol is in a drink. In the [place] one drink equals one 12 oz bottle of beer (355 mL), one 5 oz glass of wine (148 mL), or one 1½ oz glass of hard liquor (44 mL).  Reading food labels  Check food labels for:  Trans fats.  Partially hydrogenated oils.  Saturated fat (g) in each serving.  Cholesterol (mg) in each serving.  Fiber (g) in each serving.  Choose foods with healthy fats, such as:  Monounsaturated fats and polyunsaturated fats. These include olive and canola oil, flaxseeds, walnuts, almonds, and seeds.  Omega-3 fats. These are found in certain fish, flaxseed oil, and ground flaxseeds.  Choose grain products that have whole grains. Look for the word \"whole\" as the first word in the ingredient list.  Cooking  Cook foods using low-fat methods. These include baking, boiling, grilling, and broiling.  Eat more home-cooked foods. Eat at restaurants and buffets less often. Eat less fast food.  Avoid cooking using saturated fats, such as butter, cream, palm oil, palm kernel oil, and " coconut oil.  Meal planning    At meals, divide your plate into four equal parts:  Fill one-half of your plate with vegetables, green salads, and fruit.  Fill one-fourth of your plate with whole grains.  Fill one-fourth of your plate with low-fat (lean) protein foods.  Eat fish that is high in omega-3 fats at least two times a week. This includes mackerel, tuna, sardines, and salmon.  Eat foods that are high in fiber, such as whole grains, beans, apples, pears, berries, broccoli, carrots, peas, and barley.  What foods should I eat?  Fruits  All fresh, canned (in natural juice), or frozen fruits.  Vegetables  Fresh or frozen vegetables (raw, steamed, roasted, or grilled). Green salads.  Grains  Whole grains, such as whole wheat or whole grain breads, crackers, cereals, and pasta. Unsweetened oatmeal, bulgur, barley, quinoa, or brown rice. Corn or whole wheat flour tortillas.  Meats and other protein foods  Ground beef (85% or leaner), grass-fed beef, or beef trimmed of fat. Skinless chicken or turkey. Ground chicken or turkey. Pork trimmed of fat. All fish and seafood. Egg whites. Dried beans, peas, or lentils. Unsalted nuts or seeds. Unsalted canned beans. Nut butters without added sugar or oil.  Dairy  Low-fat or nonfat dairy products, such as skim or 1% milk, 2% or reduced-fat cheeses, low-fat and fat-free ricotta or cottage cheese, or plain low-fat and nonfat yogurt.  Fats and oils  Tub margarine without trans fats. Light or reduced-fat mayonnaise and salad dressings. Avocado. Olive, canola, sesame, or safflower oils.  The items listed above may not be a complete list of foods and beverages you can eat. Contact a dietitian for more information.  What foods should I avoid?  Fruits  Canned fruit in heavy syrup. Fruit in cream or butter sauce. Fried fruit.  Vegetables  Vegetables cooked in cheese, cream, or butter sauce. Fried vegetables.  Grains  White bread. White pasta. White rice. Cornbread. Bagels, pastries,  and croissants. Crackers and snack foods that contain trans fat and hydrogenated oils.  Meats and other protein foods  Fatty cuts of meat. Ribs, chicken wings, crocker, sausage, bologna, salami, chitterlings, fatback, hot dogs, bratwurst, and packaged lunch meats. Liver and organ meats. Whole eggs and egg yolks. Chicken and turkey with skin. Fried meat.  Dairy  Whole or 2% milk, cream, half-and-half, and cream cheese. Whole milk cheeses. Whole-fat or sweetened yogurt. Full-fat cheeses. Nondairy creamers and whipped toppings. Processed cheese, cheese spreads, and cheese curds.  Fats and oils  Butter, stick margarine, lard, shortening, ghee, or crocker fat. Coconut, palm kernel, and palm oils.  Beverages  Alcohol. Sugar-sweetened drinks such as sodas, lemonade, and fruit drinks.  Sweets and desserts  Corn syrup, sugars, honey, and molasses. Candy. Jam and jelly. Syrup. Sweetened cereals. Cookies, pies, cakes, donuts, muffins, and ice cream.  The items listed above may not be a complete list of foods and beverages you should avoid. Contact a dietitian for more information.  Summary  Choosing the right foods helps keep your fat and cholesterol at normal levels. This can keep you from getting certain diseases.  At meals, fill one-half of your plate with vegetables, green salads, and fruits.  Eat high fiber foods, like whole grains, beans, apples, pears, berries, carrots, peas, and barley.  Limit added sugar, saturated fats, alcohol, and fried foods.  This information is not intended to replace advice given to you by your health care provider. Make sure you discuss any questions you have with your health care provider.  Document Revised: 04/29/2022 Document Reviewed: 04/29/2022  Elsevier Patient Education © 2024 [employer]

## 2025-04-01 RX ORDER — FLUOXETINE 10 MG/1
30 CAPSULE ORAL DAILY
Qty: 90 CAPSULE | Refills: 1 | OUTPATIENT
Start: 2025-04-01

## 2025-04-02 ENCOUNTER — OFFICE VISIT (OUTPATIENT)
Dept: FAMILY MEDICINE CLINIC | Facility: CLINIC | Age: 35
End: 2025-04-02
Payer: COMMERCIAL

## 2025-04-02 VITALS
BODY MASS INDEX: 47.13 KG/M2 | HEART RATE: 69 BPM | TEMPERATURE: 98.6 F | HEIGHT: 63 IN | SYSTOLIC BLOOD PRESSURE: 110 MMHG | OXYGEN SATURATION: 99 % | DIASTOLIC BLOOD PRESSURE: 74 MMHG | WEIGHT: 266 LBS

## 2025-04-02 DIAGNOSIS — K21.9 GASTROESOPHAGEAL REFLUX DISEASE, UNSPECIFIED WHETHER ESOPHAGITIS PRESENT: ICD-10-CM

## 2025-04-02 DIAGNOSIS — F34.1 PERSISTENT DEPRESSIVE DISORDER: ICD-10-CM

## 2025-04-02 DIAGNOSIS — J30.2 SEASONAL ALLERGIC RHINITIS, UNSPECIFIED TRIGGER: ICD-10-CM

## 2025-04-02 DIAGNOSIS — R73.03 PREDIABETES: ICD-10-CM

## 2025-04-02 DIAGNOSIS — F41.1 GENERALIZED ANXIETY DISORDER: Primary | ICD-10-CM

## 2025-04-02 DIAGNOSIS — I10 ESSENTIAL HYPERTENSION: ICD-10-CM

## 2025-04-02 DIAGNOSIS — Z00.00 HEALTH MAINTENANCE EXAMINATION: ICD-10-CM

## 2025-04-02 DIAGNOSIS — E55.9 VITAMIN D DEFICIENCY: ICD-10-CM

## 2025-04-02 LAB
25(OH)D3 SERPL-MCNC: 32.7 NG/ML (ref 30–100)
ALBUMIN SERPL-MCNC: 4.2 G/DL (ref 3.5–5.2)
ALBUMIN/GLOB SERPL: 1.5 G/DL
ALP SERPL-CCNC: 59 U/L (ref 39–117)
ALT SERPL W P-5'-P-CCNC: 56 U/L (ref 1–33)
ANION GAP SERPL CALCULATED.3IONS-SCNC: 12.7 MMOL/L (ref 5–15)
AST SERPL-CCNC: 34 U/L (ref 1–32)
BASOPHILS # BLD AUTO: 0.06 10*3/MM3 (ref 0–0.2)
BASOPHILS NFR BLD AUTO: 0.7 % (ref 0–1.5)
BILIRUB SERPL-MCNC: 0.7 MG/DL (ref 0–1.2)
BUN SERPL-MCNC: 9 MG/DL (ref 6–20)
BUN/CREAT SERPL: 13 (ref 7–25)
CALCIUM SPEC-SCNC: 9.4 MG/DL (ref 8.6–10.5)
CHLORIDE SERPL-SCNC: 101 MMOL/L (ref 98–107)
CHOLEST SERPL-MCNC: 187 MG/DL (ref 0–200)
CO2 SERPL-SCNC: 22.3 MMOL/L (ref 22–29)
CREAT SERPL-MCNC: 0.69 MG/DL (ref 0.57–1)
DEPRECATED RDW RBC AUTO: 42 FL (ref 37–54)
EGFRCR SERPLBLD CKD-EPI 2021: 115.5 ML/MIN/1.73
EOSINOPHIL # BLD AUTO: 0.54 10*3/MM3 (ref 0–0.4)
EOSINOPHIL NFR BLD AUTO: 6.2 % (ref 0.3–6.2)
ERYTHROCYTE [DISTWIDTH] IN BLOOD BY AUTOMATED COUNT: 13.6 % (ref 12.3–15.4)
GLOBULIN UR ELPH-MCNC: 2.8 GM/DL
GLUCOSE SERPL-MCNC: 90 MG/DL (ref 65–99)
HBA1C MFR BLD: 5.8 % (ref 4.8–5.6)
HCT VFR BLD AUTO: 43.7 % (ref 34–46.6)
HCV AB SER QL: NORMAL
HDLC SERPL-MCNC: 46 MG/DL (ref 40–60)
HGB BLD-MCNC: 14.3 G/DL (ref 12–15.9)
IMM GRANULOCYTES # BLD AUTO: 0.02 10*3/MM3 (ref 0–0.05)
IMM GRANULOCYTES NFR BLD AUTO: 0.2 % (ref 0–0.5)
LDLC SERPL CALC-MCNC: 122 MG/DL (ref 0–100)
LDLC/HDLC SERPL: 2.61 {RATIO}
LYMPHOCYTES # BLD AUTO: 1.64 10*3/MM3 (ref 0.7–3.1)
LYMPHOCYTES NFR BLD AUTO: 18.9 % (ref 19.6–45.3)
MCH RBC QN AUTO: 27.8 PG (ref 26.6–33)
MCHC RBC AUTO-ENTMCNC: 32.7 G/DL (ref 31.5–35.7)
MCV RBC AUTO: 85 FL (ref 79–97)
MONOCYTES # BLD AUTO: 0.66 10*3/MM3 (ref 0.1–0.9)
MONOCYTES NFR BLD AUTO: 7.6 % (ref 5–12)
NEUTROPHILS NFR BLD AUTO: 5.78 10*3/MM3 (ref 1.7–7)
NEUTROPHILS NFR BLD AUTO: 66.4 % (ref 42.7–76)
NRBC BLD AUTO-RTO: 0 /100 WBC (ref 0–0.2)
PLATELET # BLD AUTO: 286 10*3/MM3 (ref 140–450)
PMV BLD AUTO: 9.6 FL (ref 6–12)
POTASSIUM SERPL-SCNC: 4.3 MMOL/L (ref 3.5–5.2)
PROT SERPL-MCNC: 7 G/DL (ref 6–8.5)
RBC # BLD AUTO: 5.14 10*6/MM3 (ref 3.77–5.28)
SODIUM SERPL-SCNC: 136 MMOL/L (ref 136–145)
TRIGL SERPL-MCNC: 104 MG/DL (ref 0–150)
TSH SERPL DL<=0.05 MIU/L-ACNC: 2.1 UIU/ML (ref 0.27–4.2)
VLDLC SERPL-MCNC: 19 MG/DL (ref 5–40)
WBC NRBC COR # BLD AUTO: 8.7 10*3/MM3 (ref 3.4–10.8)

## 2025-04-02 PROCEDURE — 84443 ASSAY THYROID STIM HORMONE: CPT | Performed by: PHYSICIAN ASSISTANT

## 2025-04-02 PROCEDURE — 80061 LIPID PANEL: CPT | Performed by: PHYSICIAN ASSISTANT

## 2025-04-02 PROCEDURE — 83525 ASSAY OF INSULIN: CPT | Performed by: PHYSICIAN ASSISTANT

## 2025-04-02 PROCEDURE — 86803 HEPATITIS C AB TEST: CPT | Performed by: PHYSICIAN ASSISTANT

## 2025-04-02 PROCEDURE — 85025 COMPLETE CBC W/AUTO DIFF WBC: CPT | Performed by: PHYSICIAN ASSISTANT

## 2025-04-02 PROCEDURE — 80053 COMPREHEN METABOLIC PANEL: CPT | Performed by: PHYSICIAN ASSISTANT

## 2025-04-02 PROCEDURE — 83036 HEMOGLOBIN GLYCOSYLATED A1C: CPT | Performed by: PHYSICIAN ASSISTANT

## 2025-04-02 PROCEDURE — 83527 ASSAY OF INSULIN: CPT | Performed by: PHYSICIAN ASSISTANT

## 2025-04-02 PROCEDURE — 82306 VITAMIN D 25 HYDROXY: CPT | Performed by: PHYSICIAN ASSISTANT

## 2025-04-02 RX ORDER — PROPRANOLOL HYDROCHLORIDE 80 MG/1
80 CAPSULE, EXTENDED RELEASE ORAL DAILY
Qty: 90 CAPSULE | Refills: 1 | Status: SHIPPED | OUTPATIENT
Start: 2025-04-02

## 2025-04-02 RX ORDER — CETIRIZINE HYDROCHLORIDE 10 MG/1
10 TABLET ORAL DAILY
Qty: 90 TABLET | Refills: 1 | Status: SHIPPED | OUTPATIENT
Start: 2025-04-02

## 2025-04-02 RX ORDER — FLUOXETINE 10 MG/1
30 CAPSULE ORAL DAILY
Qty: 90 CAPSULE | Refills: 1 | Status: SHIPPED | OUTPATIENT
Start: 2025-04-02

## 2025-04-02 RX ORDER — FAMOTIDINE 40 MG/1
40 TABLET, FILM COATED ORAL DAILY
Qty: 90 TABLET | Refills: 1 | Status: SHIPPED | OUTPATIENT
Start: 2025-04-02

## 2025-04-02 NOTE — PROGRESS NOTES
Subjective      Chief Complaint  Med Refill BP, anxiety     Subjective      Rosa Thakkar is a 34 y.o. female who presents today to [facility] for follow up multiple medical conditions and for sore throat. Past medical history is significant for anxiety, GERD, depression, and obesity.     Anxiety:   Depression:   Currently managed on fluoxetine 30 mg daily and reports recently increased anxiety.  No reported depressive symptoms.  She has been taking her medication regularly.    She has a family history of anxiety, depression, and suicide attempts in both her mother and sister.  She has also been on BuSpar in the past, however, feels that she did not give it appropriate time to help her symptoms.     GERD:  Stable on pepcid 40 mg daily.  No previous history of endoscopies.  She was set up for an EGD in the past, but did not follow through.    Chronic allergic rhinitis:   As long as she takes her cetirizine 10 mg daily, she does fairly well.  Denies any specific allergens.  Recently treated with cefdinir for a reported upper respiratory infection.  Some improvement noted and symptoms were showing gradual improvement.    Palpitations:   Hypertension:  Previously changed from atenolol to propranolol LA.  Reports palpitations are controlled.  BP in the office today is 110/74, overall improved    Morbid obesity per BMI:   Prediabetes:   Reports previous weight loss when she was placed on metformin for prediabetes a few years ago.  Her levels improved as did her weight, however, she came off of the medicine and she gained all of her weight back +.      She is still interested in medication assistance with weight loss.  No recent laboratory testing, she is agreeable to update these today.  Last HA1C was 5.7%, however, this was in 2023.    Given prediabetes with HA1C of 5.7%, we did a trial of metformin to assist with glucose control and help with weight loss.  She was unable to tolerate  "the immediate release or extended release due to diarrhea and has since stopped it.    Vitamin D deficiency:  Currently on vitamin D supplementation at 2000 units daily OTC.      Current Outpatient Medications:     cefdinir (OMNICEF) 300 MG capsule, Take 2 capsules by mouth Daily for 10 days., Disp: 20 capsule, Rfl: 0    cetirizine (zyrTEC) 10 MG tablet, Take 1 tablet by mouth Daily., Disp: 90 tablet, Rfl: 1    Cholecalciferol (Vitamin D-3) 25 MCG (1000 UT) capsule, Take 2,000 Units by mouth., Disp: , Rfl:     famotidine (PEPCID) 40 MG tablet, Take 1 tablet by mouth Daily., Disp: 90 tablet, Rfl: 1    fluconazole (Diflucan) 150 MG tablet, Take 1 tablet by mouth Daily., Disp: 2 tablet, Rfl: 0    FLUoxetine (PROzac) 10 MG capsule, Take 3 capsules by mouth Daily., Disp: 90 capsule, Rfl: 1    propranolol LA (INDERAL LA) 80 MG 24 hr capsule, Take 1 capsule by mouth Daily., Disp: 90 capsule, Rfl: 1      No Known Allergies    Objective     Objective   Vital Signs:  /74   Pulse 69   Temp 98.6 °F (37 °C) (Oral)   Ht 160 cm (62.99\")   Wt 121 kg (266 lb)   SpO2 99%   BMI 47.13 kg/m²   Estimated body mass index is 47.13 kg/m² as calculated from the following:    Height as of this encounter: 160 cm (62.99\").    Weight as of this encounter: 121 kg (266 lb).    Class 3 Severe Obesity (BMI >=40). Obesity-related health conditions include the following: GERD. Obesity is unchanged. BMI is is above average; BMI management plan is completed. We discussed portion control and increasing exercise.    Past Medical History:   Diagnosis Date    Anxiety     Depression     Fatigue     GERD (gastroesophageal reflux disease)      Past Surgical History:   Procedure Laterality Date    CHOLECYSTECTOMY      DENTAL PROCEDURE      OVARIAN CYST REMOVAL      TONSILLECTOMY       Social History     Socioeconomic History    Marital status:    Tobacco Use    Smoking status: Former     Current packs/day: 0.00     Average packs/day: 1 " pack/day for 15.0 years (15.0 ttl pk-yrs)     Types: Cigarettes     Start date: 3/3/2002     Quit date: 3/3/2017     Years since quittin.0    Smokeless tobacco: Never   Vaping Use    Vaping status: Never Used   Substance and Sexual Activity    Alcohol use: No    Drug use: No    Sexual activity: Yes     Partners: Male     Birth control/protection: None      Physical Exam  Vitals and nursing note reviewed.   Constitutional:       General: She is not in acute distress.     Appearance: She is well-developed. She is not diaphoretic.   HENT:      Head: Normocephalic and atraumatic.   Eyes:      General: No scleral icterus.        Right eye: No discharge.         Left eye: No discharge.      Conjunctiva/sclera: Conjunctivae normal.   Cardiovascular:      Rate and Rhythm: Normal rate and regular rhythm.      Heart sounds: Normal heart sounds. No murmur heard.     No friction rub. No gallop.   Pulmonary:      Effort: Pulmonary effort is normal. No respiratory distress.      Breath sounds: Normal breath sounds. No wheezing or rales.   Chest:      Chest wall: No tenderness.   Musculoskeletal:         General: Normal range of motion.      Cervical back: Normal range of motion and neck supple.   Skin:     General: Skin is warm and dry.      Coloration: Skin is not pale.      Findings: No erythema or rash.   Neurological:      Mental Status: She is alert and oriented to person, place, and time.   Psychiatric:         Behavior: Behavior normal.        Result Review :  No visits with results within 3 Month(s) from this visit.   Latest known visit with results is:     Reviewed by MAITE Stanford 25  Lab Results   Component Value Date    WBC 7.35 2023    HGB 14.4 2023    HCT 44.1 2023    MCV 83.1 2023     2023     Lab Results   Component Value Date    GLUCOSE 95 2023    BUN 11 2023    CREATININE 0.73 2023    EGFR 110.1 2023    BCR 15.1 2023    K 4.5  05/08/2023    CO2 21.7 (L) 05/08/2023    CALCIUM 9.5 05/08/2023    ALBUMIN 4.3 05/08/2023    BILITOT 0.7 05/08/2023    AST 31 05/08/2023    ALT 54 (H) 05/08/2023     Lab Results   Component Value Date    HGBA1C 5.70 (H) 05/08/2023     Lab Results   Component Value Date    TSH 2.370 05/08/2023     Lab Results   Component Value Date    CHOL 158 05/08/2023     Lab Results   Component Value Date    TRIG 107 05/08/2023     Lab Results   Component Value Date    HDL 47 05/08/2023     Lab Results   Component Value Date    LDL 91 05/08/2023           Assessment / Plan         Assessment   Diagnoses and all orders for this visit:    1.  Generalized anxiety disorder  2. Persistent depressive disorder  Continue current dose of fluoxetine and propranolol LA.  Symptoms overall improved.  Return to clinic if no improvement noted or if symptoms are worsening.     3.  Essential hypertension  Controlled on propranolol LA 80 mg daily.  Refill sent to pharmacy.  Limit sodium in the diet.    4. Seasonal allergic rhinitis, unspecified trigger  Continue cetirizine.  Avoid triggers.  Refill sent to pharmacy.     5. Gastroesophageal reflux disease, unspecified whether esophagitis present  Continue famotidine.  Avoid triggers.  Refill sent to pharmacy.    6.  Health maintenance  7.  Vitamin D deficiency  8.  Morbid obesity with BMI of 45.0-49.9, adult (HCC)  9.  Prediabetes  Discussed diet, exercise, and cutting out sugary beverages.  Given she did not tolerate metformin IR or XR.   Obtain updated laboratory testing including HA1C, TSH, FLP.  She remains interested in GLP-1 agonist if she can get them through her insurance.  If not, we did discuss the options of either topiramate or phentermine.  Information provided in the AVS.  She will review and consider if the GLP-1's will not be covered on her insurance.    Health Maintenance  She reports it's been some time since her last pap smear. Encouraged on scheduling.   One-time hepatitis C  screening pending.     Follow Up   Return in about 1 month (around 5/2/2025), or if symptoms worsen or fail to improve.    Patient was given instructions and counseling regarding her condition or for health maintenance advice. Please see specific information pulled into the AVS if appropriate.       This document has been electronically signed by MAITE Stanford   April 2, 2025 12:37 EDT    Dictated Utilizing Dragon Dictation: Part of this note may be an electronic transcription/translation of spoken language to printed text using the Dragon Dictation System.

## 2025-04-10 LAB
INSULIN FREE SERPL-ACNC: 17 UU/ML
INSULIN SERPL-ACNC: 17 UU/ML

## 2025-06-09 RX ORDER — FLUOXETINE 10 MG/1
30 CAPSULE ORAL DAILY
Qty: 90 CAPSULE | Refills: 3 | Status: SHIPPED | OUTPATIENT
Start: 2025-06-09

## 2025-07-07 RX ORDER — PROPRANOLOL HYDROCHLORIDE 80 MG/1
80 CAPSULE, EXTENDED RELEASE ORAL DAILY
Qty: 30 CAPSULE | Refills: 3 | Status: SHIPPED | OUTPATIENT
Start: 2025-07-07

## 2025-07-09 ENCOUNTER — PROCEDURE VISIT (OUTPATIENT)
Dept: FAMILY MEDICINE CLINIC | Facility: CLINIC | Age: 35
End: 2025-07-09
Payer: COMMERCIAL

## 2025-07-09 VITALS
HEIGHT: 63 IN | HEART RATE: 78 BPM | DIASTOLIC BLOOD PRESSURE: 68 MMHG | OXYGEN SATURATION: 98 % | TEMPERATURE: 97.2 F | SYSTOLIC BLOOD PRESSURE: 110 MMHG | WEIGHT: 272 LBS | BODY MASS INDEX: 48.2 KG/M2

## 2025-07-09 DIAGNOSIS — K62.5 RECTAL BLEEDING: ICD-10-CM

## 2025-07-09 DIAGNOSIS — Z12.4 ENCOUNTER FOR PAPANICOLAOU SMEAR FOR CERVICAL CANCER SCREENING: Primary | ICD-10-CM

## 2025-07-09 DIAGNOSIS — F41.1 GENERALIZED ANXIETY DISORDER: ICD-10-CM

## 2025-07-09 DIAGNOSIS — F33.1 MODERATE EPISODE OF RECURRENT MAJOR DEPRESSIVE DISORDER: ICD-10-CM

## 2025-07-09 DIAGNOSIS — K60.2 ANAL FISSURE: ICD-10-CM

## 2025-07-09 DIAGNOSIS — R10.2 PELVIC PAIN: ICD-10-CM

## 2025-07-09 PROCEDURE — 99214 OFFICE O/P EST MOD 30 MIN: CPT | Performed by: PHYSICIAN ASSISTANT

## 2025-07-09 RX ORDER — POLYETHYLENE GLYCOL 3350 17 G/17G
17 POWDER, FOR SOLUTION ORAL DAILY
Qty: 578 G | Refills: 0 | Status: SHIPPED | OUTPATIENT
Start: 2025-07-09

## 2025-07-09 NOTE — PROGRESS NOTES
Subjective        Chief Complaint  Annual Exam (Pap), Rectal Bleeding, and Anxiety (Medication change)    Subjective      Rosa Thakkar is a 35 y.o. female who presents today to [facility] for Annual Exam (Pap), Rectal Bleeding, and Anxiety (Medication change). She has a past medical history of Anxiety, Depression, Fatigue, and GERD.    Annual well woman exam  She is here for her annual well woman exam with pap smear and breast exam.  She denies any acute concerns other than chronic intermittent lower pelvic pain.  Periods have become more regular as she has gotten older and not as heavy as they have been in the past.  She has a suspected diagnosis of PCOS due to previous chronic irregular and heavy periods, hirsutism, obesity, with difficulty losing weight.  She has had concern for endometriosis as well.  Mother had a history of endometriosis as well as her sister.  Sister recently had a hysterectomy at 31 years old and was told she had precancerous cells.  Complete details unavailable.      Constipation  Rectal bleeding  She also reports a recent episode of a bowel movement which she had to strain and subsequently felt like she was passing glass.  She had some significant rectal bleeding at that time which is since resolved.  She had had some perianal itching since then.       Generalized anxiety  Depression  Has a known history of generalized anxiety and depression.  Currently managed on fluoxetine 30 mg daily and propranolol LA 80 mg daily.  Reports anxiety is currently uncontrolled, however, her depression is not doing well either.  She has had lack of motivation and energy.  She has been unable to upkeep her home and does not have the energy to spend time with her family.  Denies any SI/HI.      Weight management  Current weight: 272 lbs  Current BMI: 48.2  She reports she is chronically been heavy.  Struggling with motivation as noted above.  Has been working on eating  "more at home, continues to drink a lot of sugary beverages.  Has been depressed, therefore has not been getting out and she has gained weight since her last visit.  We have previously been unable to obtain GLP-1 agonist to assist with weight loss.  Her anxiety has not been controlled, therefore we could not try other medication such as phentermine.      Current Outpatient Medications:     cetirizine (zyrTEC) 10 MG tablet, Take 1 tablet by mouth Daily., Disp: 90 tablet, Rfl: 1    Cholecalciferol (Vitamin D-3) 25 MCG (1000 UT) capsule, Take 1,000 Units by mouth Daily., Disp: 30 capsule, Rfl: 5    famotidine (PEPCID) 40 MG tablet, Take 1 tablet by mouth Daily., Disp: 90 tablet, Rfl: 1    FLUoxetine (PROzac) 10 MG capsule, TAKE 3 CAPSULES BY MOUTH DAILY, Disp: 90 capsule, Rfl: 3    propranolol LA (INDERAL LA) 80 MG 24 hr capsule, TAKE 1 CAPSULE BY MOUTH DAILY, Disp: 30 capsule, Rfl: 3    Dextromethorphan-buPROPion ER (AUVELITY)  [facility] tablet [facility], Disp: 30 tablet, Rfl: 0    polyethylene glycol (MIRALAX) 17 GM/SCOOP powder, Take 17 g by mouth Daily., Disp: 578 g, Rfl: 0      No Known Allergies    Objective     Objective   Vital Signs:  /68   Pulse 78   Temp 97.2 °F (36.2 °C) (Temporal)   Ht 160 cm (62.99\")   Wt 123 kg (272 lb)   SpO2 98%   BMI 48.20 kg/m²   Estimated body mass index is 48.2 kg/m² as calculated from the following:    Height as of this encounter: 160 cm (62.99\").    Weight as of this encounter: 123 kg (272 lb).    Past Medical History:   Diagnosis Date    Anxiety     Depression     Fatigue     GERD (gastroesophageal reflux disease)      Past Surgical History:   Procedure Laterality Date    CHOLECYSTECTOMY      DENTAL PROCEDURE      OVARIAN CYST REMOVAL      TONSILLECTOMY       Social History     Socioeconomic History    Marital status:    Tobacco Use    Smoking status: Former     Current packs/day: 0.00     Average packs/day: 1 pack/day for 15.0 years " (15.0 ttl pk-yrs)     Types: Cigarettes     Start date: 3/3/2002     Quit date: 3/3/2017     Years since quittin.3    Smokeless tobacco: Never   Vaping Use    Vaping status: Never Used   Substance and Sexual Activity    Alcohol use: No    Drug use: No    Sexual activity: Yes     Partners: Male     Birth control/protection: None        Physical Exam  Vitals and nursing note reviewed. Exam conducted with a chaperone present (Lolly MOODY.).   Constitutional:       General: She is not in acute distress.     Appearance: She is well-developed. She is not diaphoretic.   HENT:      Head: Normocephalic and atraumatic.   Eyes:      General: No scleral icterus.        Right eye: No discharge.         Left eye: No discharge.      Conjunctiva/sclera: Conjunctivae normal.   Cardiovascular:      Rate and Rhythm: Normal rate and regular rhythm.      Heart sounds: Normal heart sounds. No murmur heard.     No friction rub. No gallop.   Pulmonary:      Effort: Pulmonary effort is normal. No respiratory distress.      Breath sounds: Normal breath sounds. No wheezing or rales.   Chest:      Chest wall: No tenderness.   Genitourinary:     Exam position: Lithotomy position.      Comments: Cervix is without any erythema or friability. Bilateral adnexal tenderness on bimanual exam without palpable mass.  Musculoskeletal:         General: Normal range of motion.      Cervical back: Normal range of motion and neck supple.   Skin:     General: Skin is warm and dry.      Coloration: Skin is not pale.      Findings: No erythema or rash.   Neurological:      Mental Status: She is alert and oriented to person, place, and time.   Psychiatric:         Behavior: Behavior normal.        Result Review :  The following data was reviewed by: MAITE Stanford on 2025:  Hemoglobin A1C   Date Value Ref Range Status   2025 5.80 (H) 4.80 - 5.60 % Final     TSH   Date Value Ref Range Status   2025 2.100 0.270 - 4.200 uIU/mL Final      HDL Cholesterol   Date Value Ref Range Status   04/02/2025 46 40 - 60 mg/dL Final     LDL Cholesterol    Date Value Ref Range Status   04/02/2025 122 (H) 0 - 100 mg/dL Final     Triglycerides   Date Value Ref Range Status   04/02/2025 104 0 - 150 mg/dL Final             Assessment / Plan         Assessment   Diagnoses and all orders for this visit:    1. Encounter for Papanicolaou smear for cervical cancer screening (Primary)  2. Pelvic pain  -Pap smear performed today, sample sent. Will notify of results when available.   -Obtain transvaginal US.   -Monitor for any new or worsening symptoms and return to clinic should concerns arise.  -     LIQUID-BASED PAP SMEAR, [facility] ([facility])  -     US Non-ob Transvaginal; Future    3. Moderate episode of recurrent major depressive disorder  4. Generalized anxiety disorder  -Symptoms persistent despite fluoxetine 30mg daily and Propranolol LA 80mg daily. Has tried buspar in the past.   -Having worsening depression and lack of motivation.   -Discussed options, will start Auvelity 45-105mg daily.   -Follow up in 3-4 weeks for close monitoring, sooner if concerns arise.   -     Dextromethorphan-buPROPion ER (AUVELITY)  MG tablet controlled-release; [facility] Jan 2027  Dispense: 30 tablet; Refill: 0    5. Anal fissure  6. Rectal bleeding  -Discussed prevention with increased fiber diet, increasing fluid intake, miralax.   -Sitz baths and topical zinc oxide can help with irritation while healing.   -Monitor for any new or worsening symptoms and return to clinic should concerns arise.  -     polyethylene glycol (MIRALAX) 17 GM/SCOOP powder; Take 17 g by mouth Daily.  Dispense: 578 g; Refill: 0         New Medications Ordered This Visit   Medications    Dextromethorphan-buPROPion ER (AUVELITY)  MG tablet controlled-release     Sig: [facility] Jan 2027     Dispense:  30 tablet     Refill:  0     Lot Number?:   [id number]     Expiration Date?:   1/1/2027    polyethylene  glycol (MIRALAX) 17 GM/SCOOP powder     Sig: Take 17 g by mouth Daily.     Dispense:  578 g     Refill:  0     Follow Up   Return in about 4 weeks (around 8/6/2025) for Recheck.    Patient was given instructions and counseling regarding her condition or for health maintenance advice. Please see specific information pulled into the AVS if appropriate.       This document has been electronically signed by MAITE Stanford   July 15, 2025 07:17 EDT    Dictated Utilizing Dragon Dictation: Part of this note may be an electronic transcription/translation of spoken language to printed text using the Dragon Dictation System.

## 2025-07-09 NOTE — PATIENT INSTRUCTIONS
BMI for Adults  Body mass index (BMI) is a number that is calculated from a person's weight and height. In most adults, the number is used to find how much of an adult's weight is made up of fat. BMI is not as accurate as a direct measure of body fat.  HOW IS BMI CALCULATED?  BMI is calculated by dividing weight in kilograms by height in meters squared. It can also be calculated by dividing weight in pounds by height in inches squared, then multiplying the resulting number by 703. Charts are available to help you find your BMI quickly and easily without doing this calculation.   HOW IS BMI INTERPRETED?  Health care professionals use BMI charts to identify whether an adult is underweight, at a normal weight, or overweight based on the following guidelines:  Underweight: BMI less than 18.5.  Normal weight: BMI between 18.5 and 24.9.  Overweight: BMI between 25 and 29.9.  Obese: BMI of 30 and above.  BMI is usually interpreted the same for males and females.  Weight includes both fat and muscle, so someone with a muscular build, such as an athlete, may have a BMI that is higher than 24.9. In cases like these, BMI may not accurately depict body fat. To determine if excess body fat is the cause of a BMI of 25 or higher, further assessments may need to be done by a health care provider.  WHY IS BMI A USEFUL TOOL?  BMI is used to identify a possible weight problem that may be related to a medical problem or may increase the risk for medical problems. BMI can also be used to promote changes to reach a healthy weight.     This information is not intended to replace advice given to you by your health care provider. Make sure you discuss any questions you have with your health care provider.     Document Released: 08/29/2005 Document Revised: 01/08/2016 Document Reviewed: 05/15/2015  [employer] Interactive Patient Education ©2017 [employer]       Calorie Counting for Weight Loss  Calories are energy you get from the things  you eat and drink. Your body uses this energy to keep you going throughout the day. The number of calories you eat affects your weight. When you eat more calories than your body needs, your body stores the extra calories as fat. When you eat fewer calories than your body needs, your body burns fat to get the energy it needs.  Calorie counting means keeping track of how many calories you eat and drink each day. If you make sure to eat fewer calories than your body needs, you should lose weight. In order for calorie counting to work, you will need to eat the number of calories that are right for you in a day to lose a healthy amount of weight per week. A healthy amount of weight to lose per week is usually 1-2 lb (0.5-0.9 kg). A dietitian can determine how many calories you need in a day and give you suggestions on how to reach your calorie goal.   WHAT IS MY MY PLAN?  My goal is to have __________ calories per day.   If I have this many calories per day, I should lose around __________ pounds per week.  WHAT DO I NEED TO KNOW ABOUT CALORIE COUNTING?  In order to meet your daily calorie goal, you will need to:  Find out how many calories are in each food you would like to eat. Try to do this before you eat.  Decide how much of the food you can eat.  Write down what you ate and how many calories it had. Doing this is called keeping a food log.  WHERE DO I FIND CALORIE INFORMATION?  The number of calories in a food can be found on a Nutrition Facts label. Note that all the information on a label is based on a specific serving of the food. If a food does not have a Nutrition Facts label, try to look up the calories online or ask your dietitian for help.  HOW DO I DECIDE HOW MUCH TO EAT?  To decide how much of the food you can eat, you will need to consider both the number of calories in one serving and the size of one serving. This information can be found on the Nutrition Facts label. If a food does not have a Nutrition  Facts label, look up the information online or ask your dietitian for help.  Remember that calories are listed per serving. If you choose to have more than one serving of a food, you will have to multiply the calories per serving by the amount of servings you plan to eat. For example, the label on a package of bread might say that a serving size is 1 slice and that there are 90 calories in a serving. If you eat 1 slice, you will have eaten 90 calories. If you eat 2 slices, you will have eaten 180 calories.  HOW DO I KEEP A FOOD LOG?  After each meal, record the following information in your food log:  What you ate.  How much of it you ate.  How many calories it had.  Then, add up your calories.  Keep your food log near you, such as in a small notebook in your pocket. Another option is to use a mobile lorie or website. Some programs will calculate calories for you and show you how many calories you have left each time you add an item to the log.  WHAT ARE SOME CALORIE COUNTING TIPS?  Use your calories on foods and drinks that will fill you up and not leave you hungry. Some examples of this include foods like nuts and nut butters, vegetables, lean proteins, and high-fiber foods (more than 5 g fiber per serving).  Eat nutritious foods and avoid empty calories. Empty calories are calories you get from foods or beverages that do not have many nutrients, such as candy and soda. It is better to have a nutritious high-calorie food (such as an avocado) than a food with few nutrients (such as a bag of chips).  Know how many calories are in the foods you eat most often. This way, you do not have to look up how many calories they have each time you eat them.  Look out for foods that may seem like low-calorie foods but are really high-calorie foods, such as baked goods, soda, and fat-free candy.  Pay attention to calories in drinks. Drinks such as sodas, specialty coffee drinks, alcohol, and juices have a lot of calories yet do  not fill you up. Choose low-calorie drinks like water and diet drinks.  Focus your calorie counting efforts on higher calorie items. Logging the calories in a garden salad that contains only vegetables is less important than calculating the calories in a milk shake.  Find a way of tracking calories that works for you. Get creative. Most people who are successful find ways to keep track of how much they eat in a day, even if they do not count every calorie.  WHAT ARE SOME PORTION CONTROL TIPS?  Know how many calories are in a serving. This will help you know how many servings of a certain food you can have.  Use a measuring cup to measure serving sizes. This is helpful when you start out. With time, you will be able to estimate serving sizes for some foods.  Take some time to put servings of different foods on your favorite plates, bowls, and cups so you know what a serving looks like.  Try not to eat straight from a bag or box. Doing this can lead to overeating. Put the amount you would like to eat in a cup or on a plate to make sure you are eating the right portion.  Use smaller plates, glasses, and bowls to prevent overeating. This is a quick and easy way to practice portion control. If your plate is smaller, less food can fit on it.  Try not to multitask while eating, such as watching TV or using your computer. If it is time to eat, sit down at a table and enjoy your food. Doing this will help you to start recognizing when you are full. It will also make you more aware of what and how much you are eating.  HOW CAN I CALORIE COUNT WHEN EATING OUT?  Ask for smaller portion sizes or child-sized portions.  Consider sharing an entree and sides instead of getting your own entree.  If you get your own entree, eat only half. Ask for a box at the beginning of your meal and put the rest of your entree in it so you are not tempted to eat it.  Look for the calories on the menu. If calories are listed, choose the lower  "calorie options.  Choose dishes that include vegetables, fruits, whole grains, low-fat dairy products, and lean protein. Focusing on smart food choices from each of the 5 food groups can help you stay on track at restaurants.  Choose items that are boiled, broiled, grilled, or steamed.  Choose water, milk, unsweetened iced tea, or other drinks without added sugars. If you want an alcoholic beverage, choose a lower calorie option. For example, a regular desire can have up to 700 calories and a glass of wine has around 150.  Stay away from items that are buttered, battered, fried, or served with cream sauce. Items labeled \"crispy\" are usually fried, unless stated otherwise.  Ask for dressings, sauces, and syrups on the side. These are usually very high in calories, so do not eat much of them.  Watch out for salads. Many people think salads are a healthy option, but this is often not the case. Many salads come with crocker, fried chicken, lots of cheese, fried chips, and dressing. All of these items have a lot of calories. If you want a salad, choose a garden salad and ask for grilled meats or steak. Ask for the dressing on the side, or ask for olive oil and vinegar or lemon to use as dressing.  Estimate how many servings of a food you are given. For example, a serving of cooked rice is ½ cup or about the size of half a tennis ball or one cupcake wrapper. Knowing serving sizes will help you be aware of how much food you are eating at restaurants. The list below tells you how big or small some common portion sizes are based on everyday objects.  1 oz--4 stacked dice.  3 oz--1 deck of cards.  1 tsp--1 dice.  1 Tbsp--½ a Ping-Pong ball.  2 Tbsp--1 Ping-Pong ball.  ½ cup--1 tennis ball or 1 cupcake wrapper.  1 cup--1 baseball.     This information is not intended to replace advice given to you by your health care provider. Make sure you discuss any questions you have with your health care provider.     Document Released: " 12/18/2006 Document Revised: 01/08/2016 Document Reviewed: 10/23/2014  [employer] Interactive Patient Education ©2017 [employer]

## 2025-07-14 LAB — REF LAB TEST METHOD: NORMAL

## 2025-07-18 ENCOUNTER — TELEPHONE (OUTPATIENT)
Dept: FAMILY MEDICINE CLINIC | Facility: CLINIC | Age: 35
End: 2025-07-18
Payer: COMMERCIAL

## 2025-07-18 NOTE — TELEPHONE ENCOUNTER
Caller: Rosa Thakkar    Relationship: Self    Best call back number: [phone]     What is the best time to reach you: ANY    Who are you requesting to speak with (clinical staff, provider,  specific staff member): NURSE    Do you know the name of the person who called: PATIENT    What was the call regarding: ON AUVELITY, ANXIETY IS ELEVATED.  WHAT TO DO?    Is it okay if the provider responds through MyChart: PHONE CALL PLEASE

## 2025-07-18 NOTE — TELEPHONE ENCOUNTER
I spoke to Rosa and let her know that you were out of the office until Monday. She said that the new medicine auvelity was making her anxiety worse and she wanted to know if she could stop taking it. I spoke to hernesto since you were out and she gave the okay to stop taking the medication. Rosa is scheduling a follow up apt with you next week to discuss medication changes.

## 2025-07-24 ENCOUNTER — HOSPITAL ENCOUNTER (OUTPATIENT)
Dept: ULTRASOUND IMAGING | Facility: HOSPITAL | Age: 35
Discharge: HOME | End: 2025-07-24
Admitting: PHYSICIAN ASSISTANT
Payer: COMMERCIAL

## 2025-07-24 DIAGNOSIS — R10.2 PELVIC PAIN: ICD-10-CM

## 2025-07-24 PROCEDURE — 76830 TRANSVAGINAL US NON-OB: CPT | Performed by: RADIOLOGY

## 2025-07-24 PROCEDURE — 76830 TRANSVAGINAL US NON-OB: CPT
